# Patient Record
Sex: FEMALE | Race: WHITE | Employment: FULL TIME | URBAN - METROPOLITAN AREA
[De-identification: names, ages, dates, MRNs, and addresses within clinical notes are randomized per-mention and may not be internally consistent; named-entity substitution may affect disease eponyms.]

---

## 2021-09-17 LAB
ANTIBODY SCREEN, EXTERNAL: NEGATIVE
HBSAG, EXTERNAL: NEGATIVE
HCT, EXTERNAL: 34.5
HGB, EXTERNAL: 12.2
HIV, EXTERNAL: NEGATIVE
RPR, EXTERNAL: NORMAL
RUBELLA, EXTERNAL: NORMAL
TYPE, ABO & RH, EXTERNAL: NORMAL

## 2022-03-07 ENCOUNTER — TRANSCRIBE ORDER (OUTPATIENT)
Dept: SCHEDULING | Age: 22
End: 2022-03-07

## 2022-03-07 DIAGNOSIS — R00.0 TACHYCARDIA: Primary | ICD-10-CM

## 2022-03-14 ENCOUNTER — HOSPITAL ENCOUNTER (EMERGENCY)
Age: 22
Discharge: HOME | End: 2022-03-15
Attending: OBSTETRICS & GYNECOLOGY | Admitting: OBSTETRICS & GYNECOLOGY
Payer: COMMERCIAL

## 2022-03-14 PROCEDURE — 2709999900 HC NON-CHARGEABLE SUPPLY

## 2022-03-15 ENCOUNTER — HOSPITAL ENCOUNTER (OUTPATIENT)
Dept: NON INVASIVE DIAGNOSTICS | Age: 22
Discharge: HOME | End: 2022-03-15
Attending: INTERNAL MEDICINE
Payer: COMMERCIAL

## 2022-03-15 VITALS
SYSTOLIC BLOOD PRESSURE: 126 MMHG | HEART RATE: 98 BPM | DIASTOLIC BLOOD PRESSURE: 71 MMHG | OXYGEN SATURATION: 96 % | TEMPERATURE: 98.1 F | RESPIRATION RATE: 18 BRPM

## 2022-03-15 DIAGNOSIS — R00.0 TACHYCARDIA: ICD-10-CM

## 2022-03-15 LAB
A1 MICROGLOB PLACENTAL VAG QL: NEGATIVE
CONTROL LINE PRESENT?: NORMAL
ECHO AO ROOT DIAM: 1.8 CM
ECHO AV AREA PLAN: 3.3 CM2
ECHO LA DIAMETER: 3.7 CM
ECHO LA TO AORTIC ROOT RATIO: 2.06
ECHO LA VOL 4C: 29 ML (ref 22–52)
ECHO LV EDV A4C: 121 ML
ECHO LV EJECTION FRACTION A4C: 54 %
ECHO LV ESV A4C: 56 ML
ECHO LV FRACTIONAL SHORTENING: 35 % (ref 28–44)
ECHO LV INTERNAL DIMENSION DIASTOLIC: 5.2 CM (ref 3.9–5.3)
ECHO LV INTERNAL DIMENSION SYSTOLIC: 3.4 CM
ECHO LV IVSD: 0.9 CM (ref 0.6–0.9)
ECHO LV MASS 2D: 207.3 G (ref 67–162)
ECHO LV POSTERIOR WALL DIASTOLIC: 1.2 CM (ref 0.6–0.9)
ECHO LV RELATIVE WALL THICKNESS RATIO: 0.46
ECHO LVOT AREA: 3.8 CM2
ECHO LVOT DIAM: 2.2 CM
ECHO LVOT PEAK GRADIENT: 4 MMHG
ECHO LVOT PEAK VELOCITY: 0.9 M/S
ECHO MV A VELOCITY: 0.46 M/S
ECHO MV AREA PHT: 5.3 CM2
ECHO MV AREA PLAN: 9.1 CM2
ECHO MV E DECELERATION TIME (DT): 85.8 MS
ECHO MV E VELOCITY: 0.55 M/S
ECHO MV E/A RATIO: 1.2
ECHO MV MAX VELOCITY: 0.8 M/S
ECHO MV MEAN GRADIENT: 1 MMHG
ECHO MV MEAN VELOCITY: 0.5 M/S
ECHO MV PEAK GRADIENT: 2 MMHG
ECHO MV PRESSURE HALF TIME (PHT): 41.9 MS
ECHO MV REGURGITANT PEAK GRADIENT: 52 MMHG
ECHO MV REGURGITANT PEAK VELOCITY: 3.6 M/S
ECHO MV VTI: 15 CM
ECHO PV MAX VELOCITY: 0.8 M/S
ECHO PV PEAK GRADIENT: 3 MMHG
EXPIRATION DATE: NORMAL
INTERNAL NEGATIVE CONTROL: NORMAL
KIT LOT NO.: NORMAL

## 2022-03-15 PROCEDURE — 59025 FETAL NON-STRESS TEST: CPT

## 2022-03-15 PROCEDURE — 84112 EVAL AMNIOTIC FLUID PROTEIN: CPT | Performed by: OBSTETRICS & GYNECOLOGY

## 2022-03-15 PROCEDURE — 93306 TTE W/DOPPLER COMPLETE: CPT

## 2022-03-15 PROCEDURE — 99282 EMERGENCY DEPT VISIT SF MDM: CPT

## 2022-03-15 PROCEDURE — 93306 TTE W/DOPPLER COMPLETE: CPT | Performed by: SPECIALIST

## 2022-03-15 RX ORDER — LABETALOL 100 MG/1
100 TABLET, FILM COATED ORAL 2 TIMES DAILY
COMMUNITY

## 2022-03-15 NOTE — PROGRESS NOTES
12:07 AM  Patient presents with a complaint of a gush of fluid on 3/13 around 9210-8255. She states it was not long after intercourse when it happened. She reports that she hasn't had any further gushes and has not been earlene. 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 She has had +FM. She states she was put on Labetalol by a cardiologist and she has not seen Dr. Jo Sinclair since she was placed on it so she is unaware. She states she has been feeling better since being placed on it. She initially went to a cardiologist due to palpitations and dizziness. She said her b/p's were never awful with prenatal appointments but they would be high when she checked them at work. Patient placed on the monitor and an amnisure was obtained. 12:36 AM  Spoke to Jorge Parada CNM and informed her of the patient and requested she see her. 12:40 AM  Jerzy olvera CNM in to see the patient.

## 2022-03-15 NOTE — DISCHARGE INSTRUCTIONS
Patient Education   Patient Education   Patient Education        Weeks 34 to 39 of Your Pregnancy: Care Instructions  Overview     By now, your baby and your belly have grown quite large. It's almost time to give birth! Your baby's lungs are almost ready to breathe air. The skull bones are firm enough to protect your baby's head, but soft enough to move down through the birth canal.  You may be feeling excited and happy at times--but also anxious or scared. You might wonder how you'll know if you're in labor or what to expect during labor. Try to be open and flexible in your expectations of the birth. Because each birth is different, there's no way to know exactly what childbirth will be like for you. Talk to your doctor or midwife about any concerns you have. If you haven't already had the Tdap shot during this pregnancy, talk to your doctor about getting it. It will help protect your  against pertussis infection. In the 36th week, you'll probably have a test for group B streptococcus (GBS). GBS is a common type of bacteria that can live in the vagina and rectum. It can make your baby sick after birth. If you test positive, you will get antibiotics during labor. The medicine will help keep your baby from getting the bacteria. Follow-up care is a key part of your treatment and safety. Be sure to make and go to all appointments, and call your doctor if you are having problems. It's also a good idea to know your test results and keep a list of the medicines you take. How can you care for yourself at home? Learn about pain relief choices  · Pain is different for everyone. Talk with your doctor about your feelings about pain. · You can choose from several types of pain relief. These include medicine, breathing techniques, and comfort measures. You can use more than one option. · If you choose to have pain medicine during labor, talk to your doctor about your options.  Some medicines lower anxiety and help with some of the pain. Others make your lower body numb so that you won't feel pain. · Be sure to tell your doctor about your pain medicine choice before you start labor or very early in your labor. You may be able to change your mind as labor progresses. Labor and delivery  · The first stage of labor has three parts: early, active, and transition. ? It's common to have early labor at home. You can stay busy or rest, eat light snacks, drink clear fluids, and start counting contractions. ? When talking during a contraction gets hard, you may be moving to active labor. During active labor, you should head for the hospital if you aren't there already. ? You are in active labor when contractions come every 3 to 4 minutes and last about 60 seconds. Your cervix is opening more rapidly. ? If your water breaks, contractions will come faster and stronger. ? During transition, your cervix is stretching, and contractions are coming more rapidly. ? You may want to push, but your cervix might not be ready. Your doctor will tell you when to push. · The second stage starts when your cervix is completely opened and you are ready to push. ? Contractions are very strong to push the baby down the birth canal.  ? You will probably feel the urge to push. You may feel like you need to have a bowel movement. ? You may be coached to push with contractions. These contractions will be very strong, but you won't have them as often. You can get a little rest between contractions. ? One last push, and your baby is born. · The third stage is when a few more contractions push out the placenta. This may take 30 minutes or less. Where can you learn more? Go to http://www.gray.com/  Enter B912 in the search box to learn more about \"Weeks 34 to 36 of Your Pregnancy: Care Instructions. \"  Current as of: June 16, 2021               Content Version: 13.2  © 1508-0366 Healthwise, Incorporated.    Care instructions adapted under license by [employer] (which disclaims liability or warranty for this information). If you have questions about a medical condition or this instruction, always ask your healthcare professional. [address] any warranty or liability for your use of this information. Counting Your Baby's Kicks: Care Instructions  Overview     Counting your baby's kicks is one way your doctor can tell that your baby is healthy. Most women--especially in a first pregnancy--feel their baby move for the first time between 16 and 22 weeks. The movement may feel like flutters rather than kicks. Your baby may move more at certain times of the day. When you are active, you may notice less kicking than when you are resting. At your prenatal visits, your doctor will ask whether the baby is active. In your last trimester, your doctor may ask you to count the number of times you feel your baby move. Follow-up care is a key part of your treatment and safety. Be sure to make and go to all appointments, and call your doctor if you are having problems. It's also a good idea to know your test results and keep a list of the medicines you take. How do you count fetal kicks? · A common method of checking your baby's movement is to note the length of time it takes to count ten movements (such as kicks, flutters, or rolls). · Pick your baby's most active time of day to count. This may be any time from morning to evening. · If you don't feel 10 movements in an hour, have something to eat or drink and count for another hour. If you don't feel at least 10 movements in the 2-hour period, call your doctor. When should you call for help? Call your doctor now or seek immediate medical care if:    · You noticed that your baby has stopped moving or is moving much less than normal.   Watch closely for changes in your health, and be sure to contact your doctor if you have any problems.   Where can you learn more? Go to http://www.gray.com/  Enter [id number] in the search box to learn more about \"Counting Your Baby's Kicks: Care Instructions. \"  Current as of: 2021               Content Version: 13.2  © 7722-2610 [employer]. Care instructions adapted under license by [employer] (which disclaims liability or warranty for this information). If you have questions about a medical condition or this instruction, always ask your healthcare professional. [place] [address] any warranty or liability for your use of this information. Pregnancy Precautions: Care Instructions  Your Care Instructions     There is no sure way to prevent labor before your due date ( labor) or to prevent most other pregnancy problems. But there are things you can do to increase your chances of a healthy pregnancy. Go to your appointments, follow your doctor's advice, and take good care of yourself. Eat well, and exercise (if your doctor agrees). And make sure to drink plenty of water. Follow-up care is a key part of your treatment and safety. Be sure to make and go to all appointments, and call your doctor if you are having problems. It's also a good idea to know your test results and keep a list of the medicines you take. How can you care for yourself at home? · Make sure you go to your prenatal appointments. At each visit, your doctor will check your blood pressure. Your doctor will also check to see if you have protein in your urine. High blood pressure and protein in urine are signs of preeclampsia. This condition can be dangerous for you and your baby. · Drink plenty of fluids. Dehydration can cause contractions. If you have kidney, heart, or liver disease and have to limit fluids, talk with your doctor before you increase the amount of fluids you drink.   · Tell your doctor right away if you notice any symptoms of an infection, such as:  ? Burning when you urinate. ? A foul-smelling discharge from your vagina. ? Vaginal itching. ? Unexplained fever. ? Unusual pain or soreness in your uterus or lower belly. · Eat a balanced diet. Include plenty of foods that are high in calcium and iron. ? Foods high in calcium include milk, cheese, yogurt, almonds, and broccoli. ? Foods high in iron include red meat, shellfish, poultry, eggs, beans, raisins, whole-grain bread, and leafy green vegetables. · Do not smoke. If you need help quitting, talk to your doctor about stop-smoking programs and medicines. These can increase your chances of quitting for good. · Do not drink alcohol or use marijuana or illegal drugs. · Follow your doctor's directions about activity. Your doctor will let you know how much, if any, exercise you can do. · Ask your doctor if you can have sex. If you are at risk for early labor, your doctor may ask you to not have sex. · Take care to prevent falls. During pregnancy, your joints are loose, and your balance is off. Sports such as bicycling, skiing, or in-line skating can increase your risk of falling. And don't ride horses or motorcycles, dive, water ski, scuba dive, or parachute jump while you are pregnant. · Avoid things that can make your body too hot and may be harmful to your baby, such as a hot tub or sauna. Or talk with your doctor before doing anything that raises your body temperature. Your doctor can tell you if it's safe. · Do not take any over-the-counter or herbal medicines or supplements without talking to your doctor or pharmacist first.  When should you call for help? Call 911  anytime you think you may need emergency care.  For example, call if:    · You passed out (lost consciousness).     · You have a seizure.     · You have severe vaginal bleeding.     · You have severe pain in your belly or pelvis.     · You have had fluid gushing or leaking from your vagina and you know or think the umbilical cord is bulging into your vagina. If this happens, immediately get down on your knees so your rear end (buttocks) is higher than your head. This will decrease the pressure on the cord until help arrives. Call your doctor now or seek immediate medical care if:    · You have signs of preeclampsia, such as:  ? Sudden swelling of your face, hands, or feet. ? New vision problems (such as dimness, blurring, or seeing spots). ? A severe headache.     · You have any vaginal bleeding.     · You have belly pain or cramping.     · You have a fever.     · You have had regular contractions (with or without pain) for an hour. This means that you have 8 or more within 1 hour or 4 or more in 20 minutes after you change your position and drink fluids.     · You have a sudden release of fluid from your vagina.     · You have low back pain or pelvic pressure that does not go away.     · You notice that your baby has stopped moving or is moving much less than normal.   Watch closely for changes in your health, and be sure to contact your doctor if you have any problems. Where can you learn more? Go to http://www.hernandez.com/  Enter Y951 in the search box to learn more about \"Pregnancy Precautions: Care Instructions. \"  Current as of: June 16, 2021               Content Version: 13.2  © 2006-2022 Healthwise, [employer] Care instructions adapted under license by [employer] (which disclaims liability or warranty for this information). If you have questions about a medical condition or this instruction, always ask your healthcare professional. [place] [address] any warranty or liability for your use of this information.

## 2022-03-15 NOTE — H&P
[facility] History & Physical    Subjective: Rob Hoffman is a 19 y.o. female  SIUP @ 35w5d by Estimated Date of Delivery: 22 who arrives to L&D with chief complaint of leaking of fluid that started on 3/13 around 2200. She had to go to work today, so she delayed coming in until she got off. She reports having intercourse prior to the sensation of leaking. She denies any continued leaking, needing to change underwear or wear a pad. She   endorses +FM; Pt denies vaginal bleeding, fever/chills, chest pain, SOB, HA, scotomata, sudden swelling, nor malaise. She reports that this pregnancy has been complicated by maternal tachycardia for which she recently saw cardiology for and was placed on labetalol BID this week. Pregnancy problems include:     -   Patient Active Problem List   Diagnosis Code    Tachycardia R00.0       Allergies  - No Known Allergies    Prenatal Labs        OB History  OB History        1    Para   0    Term   0       0    AB   0    Living   0       SAB   0    IAB   0    Ectopic   0    Molar   0    Multiple   0    Live Births   0                 PMHx  Past Medical History:   Diagnosis Date    Essential hypertension     Thyroid activity decreased     pt was previouslt medicated before pregnancy with spironlactone        PSHx  History reviewed. No pertinent surgical history. F/SHx  History reviewed. No pertinent family history.    Social History     Socioeconomic History    Marital status: SINGLE     Spouse name: Not on file    Number of children: Not on file    Years of education: Not on file    Highest education level: Not on file   Occupational History    Not on file   Tobacco Use    Smoking status: Never Smoker    Smokeless tobacco: Never Used   Vaping Use    Vaping Use: Never used   Substance and Sexual Activity    Alcohol use: Not Currently    Drug use: Never    Sexual activity: Not on file   Other Topics Concern   [address] Service Not Asked    Blood Transfusions Not Asked    Caffeine Concern Not Asked    Occupational Exposure Not Asked    Hobby Hazards Not Asked    Sleep Concern Not Asked    Stress Concern Not Asked    Weight Concern Not Asked    Special Diet Not Asked    Back Care Not Asked    Exercise Not Asked    Bike Helmet Not Asked   [address] Not Asked    Self-Exams Not Asked   Social History Narrative    Not on file     Social Determinants of Health     Financial Resource Strain:     Difficulty of Paying Living Expenses: Not on file   Food Insecurity:     Worried About Running Out of Food in the Last Year: Not on file    Aaliyah of Food in the Last Year: Not on file   Transportation Needs:     Lack of Transportation (Medical): Not on file    Lack of Transportation (Non-Medical): Not on file   Physical Activity:     Days of Exercise per Week: Not on file    Minutes of Exercise per Session: Not on file   Stress:     Feeling of Stress : Not on file   Social Connections:     Frequency of Communication with Friends and Family: Not on file    Frequency of Social Gatherings with Friends and Family: Not on file    Attends Yazidi Services: Not on file    Active Member of [address] [place] [address] or Organizations: Not on file    Attends Club or Organization Meetings: Not on file    Marital Status: Not on file   Intimate Partner Violence:     Fear of Current or Ex-Partner: Not on file    Emotionally Abused: Not on file    Physically Abused: Not on file    Sexually Abused: Not on file   Housing Stability:     Unable to Pay for Housing in the Last Year: Not on file    Number of [place] in the Last Year: Not on file    Unstable Housing in the Last Year: Not on file       Home Medications:  Prior to Admission Medications   Prescriptions Last Dose Informant Patient Reported? Taking?   labetaloL (NORMODYNE) 100 mg tablet 3/15/2022 at Unknown time  Yes Yes   Sig: Take 100 mg by mouth two (2) times a day.       Facility-Administered Medications: None        Review of Systems:  A comprehensive review of systems was negative except for that written in the History of Present Illness. Objective:     Vitals:    03/15/22 0017 03/15/22 0020   BP: 126/71    Pulse: 98    Resp: 18    Temp: 98.1 °F (36.7 °C)    SpO2: 96% 96%      There is no height or weight on file to calculate BMI. Physical Exam:  General:  Alert, cooperative, no distress, appears stated age. Lungs:   Symmetrical expansion, non-labored   Heart:  Regular rate   GI/Abdomen:   Soft, non-tender. Gravid. Extremities: Extremities normal, atraumatic, no cyanosis or edema. Skin: Skin color, texture, turgor normal. No rashes or lesions   /Cervical Exam: no lesions or erythema  Cervix:  deferred- patient not feeling contraction   Fetal Presentation: Vertex by Leopolds  Pelvimetry: Adequate  Fetal Size: AGA      Electronic Fetal Monitoring    Fetal Heart Rate:   140, moderate variability, +accels, no decels, cat 1 FHT    Uterine Contractions: External, occasional, relaxed to palpation    Amnisure- negative     Assessment:   19 y.o. female  SIUP @  35w5 d by DILSHAD of Estimated Date of Delivery: 22   diagnoses:   -IUP at 35w5d   -no signs of rupture of membranes or labor         Pregnancy problems include:   -   Patient Active Problem List   Diagnosis Code    Tachycardia R00.0         EFM:   - Category 1  - NST: Fetal NST Findings: reactive with indication of establishing a baseline fetal evaluation conducted over a minimum of 20 minutes    Plan:   Admit to discharge home in stable condition     Patient has an appointment on Wednesday  Discussed PTL precautions and FKC. Advised patient to discuss new medication with provider at her visit      Patient has been counseled regarding this plan of care and is in agreement; all questions answered.     Collaborative MD: Dr. Dylon Barboza By:  Arnold Mancia CNM      3/15/2022 12:46 AM

## 2022-03-16 LAB — GRBS, EXTERNAL: NEGATIVE

## 2022-03-24 PROBLEM — R00.0 TACHYCARDIA: Status: ACTIVE | Noted: 2022-03-15

## 2022-04-01 ENCOUNTER — HOSPITAL ENCOUNTER (INPATIENT)
Age: 22
LOS: 6 days | Discharge: HOME | End: 2022-04-07
Attending: OBSTETRICS & GYNECOLOGY | Admitting: OBSTETRICS & GYNECOLOGY
Payer: COMMERCIAL

## 2022-04-01 DIAGNOSIS — M54.81 BILATERAL OCCIPITAL NEURALGIA: ICD-10-CM

## 2022-04-01 DIAGNOSIS — R51.9 ACUTE NONINTRACTABLE HEADACHE, UNSPECIFIED HEADACHE TYPE: Primary | ICD-10-CM

## 2022-04-01 DIAGNOSIS — Z3A.38 38 WEEKS GESTATION OF PREGNANCY: ICD-10-CM

## 2022-04-01 DIAGNOSIS — G44.86 CERVICOGENIC HEADACHE: ICD-10-CM

## 2022-04-01 PROBLEM — Z34.90 PREGNANCY: Status: ACTIVE | Noted: 2022-04-01

## 2022-04-01 LAB
ALBUMIN SERPL-MCNC: 3 G/DL (ref 3.5–5)
ALBUMIN/GLOB SERPL: 0.9 {RATIO} (ref 1.1–2.2)
ALP SERPL-CCNC: 137 U/L (ref 45–117)
ALT SERPL-CCNC: 19 U/L (ref 12–78)
ANION GAP SERPL CALC-SCNC: 10 MMOL/L (ref 5–15)
AST SERPL-CCNC: 21 U/L (ref 15–37)
BASOPHILS # BLD: 0 K/UL (ref 0–0.1)
BASOPHILS NFR BLD: 0 % (ref 0–1)
BILIRUB SERPL-MCNC: 0.3 MG/DL (ref 0.2–1)
BUN SERPL-MCNC: 7 MG/DL (ref 6–20)
BUN/CREAT SERPL: 10 (ref 12–20)
CALCIUM SERPL-MCNC: 9.2 MG/DL (ref 8.5–10.1)
CHLORIDE SERPL-SCNC: 105 MMOL/L (ref 97–108)
CO2 SERPL-SCNC: 21 MMOL/L (ref 21–32)
CREAT SERPL-MCNC: 0.68 MG/DL (ref 0.55–1.02)
CREAT UR-MCNC: 60 MG/DL
DIFFERENTIAL METHOD BLD: ABNORMAL
EOSINOPHIL # BLD: 0.1 K/UL (ref 0–0.4)
EOSINOPHIL NFR BLD: 1 % (ref 0–7)
ERYTHROCYTE [DISTWIDTH] IN BLOOD BY AUTOMATED COUNT: 13.8 % (ref 11.5–14.5)
GLOBULIN SER CALC-MCNC: 3.4 G/DL (ref 2–4)
GLUCOSE SERPL-MCNC: 98 MG/DL (ref 65–100)
HCT VFR BLD AUTO: 31.3 % (ref 35–47)
HGB BLD-MCNC: 10.2 G/DL (ref 11.5–16)
IMM GRANULOCYTES # BLD AUTO: 0 K/UL (ref 0–0.04)
IMM GRANULOCYTES NFR BLD AUTO: 1 % (ref 0–0.5)
LYMPHOCYTES # BLD: 1.9 K/UL (ref 0.8–3.5)
LYMPHOCYTES NFR BLD: 22 % (ref 12–49)
MCH RBC QN AUTO: 28.2 PG (ref 26–34)
MCHC RBC AUTO-ENTMCNC: 32.6 G/DL (ref 30–36.5)
MCV RBC AUTO: 86.5 FL (ref 80–99)
MONOCYTES # BLD: 0.9 K/UL (ref 0–1)
MONOCYTES NFR BLD: 10 % (ref 5–13)
NEUTS SEG # BLD: 5.8 K/UL (ref 1.8–8)
NEUTS SEG NFR BLD: 66 % (ref 32–75)
NRBC # BLD: 0 K/UL (ref 0–0.01)
NRBC BLD-RTO: 0 PER 100 WBC
PLATELET # BLD AUTO: 155 K/UL (ref 150–400)
PMV BLD AUTO: 11.8 FL (ref 8.9–12.9)
POTASSIUM SERPL-SCNC: 3.8 MMOL/L (ref 3.5–5.1)
PROT SERPL-MCNC: 6.4 G/DL (ref 6.4–8.2)
PROT UR-MCNC: 13 MG/DL (ref 0–11.9)
PROT/CREAT UR-RTO: 0.2
RBC # BLD AUTO: 3.62 M/UL (ref 3.8–5.2)
SODIUM SERPL-SCNC: 136 MMOL/L (ref 136–145)
WBC # BLD AUTO: 8.7 K/UL (ref 3.6–11)

## 2022-04-01 PROCEDURE — 84156 ASSAY OF PROTEIN URINE: CPT

## 2022-04-01 PROCEDURE — 36415 COLL VENOUS BLD VENIPUNCTURE: CPT

## 2022-04-01 PROCEDURE — 80053 COMPREHEN METABOLIC PANEL: CPT

## 2022-04-01 PROCEDURE — 74011250637 HC RX REV CODE- 250/637: Performed by: OBSTETRICS & GYNECOLOGY

## 2022-04-01 PROCEDURE — 65270000029 HC RM PRIVATE

## 2022-04-01 PROCEDURE — 85025 COMPLETE CBC W/AUTO DIFF WBC: CPT

## 2022-04-01 RX ORDER — OXYCODONE HYDROCHLORIDE 10 MG/1
5 TABLET ORAL
Qty: 5 TABLET | Refills: 0 | Status: SHIPPED | OUTPATIENT
Start: 2022-04-01 | End: 2022-04-04

## 2022-04-01 RX ORDER — BUTALBITAL, ACETAMINOPHEN AND CAFFEINE 50; 325; 40 MG/1; MG/1; MG/1
1 TABLET ORAL
Status: DISCONTINUED | OUTPATIENT
Start: 2022-04-01 | End: 2022-04-02

## 2022-04-01 RX ORDER — OXYCODONE HYDROCHLORIDE 5 MG/1
10 TABLET ORAL
Status: DISCONTINUED | OUTPATIENT
Start: 2022-04-01 | End: 2022-04-07 | Stop reason: HOSPADM

## 2022-04-01 RX ADMIN — OXYCODONE 10 MG: 5 TABLET ORAL at 18:47

## 2022-04-01 RX ADMIN — BUTALBITAL, ACETAMINOPHEN, AND CAFFEINE 1 TABLET: 50; 325; 40 TABLET ORAL at 17:11

## 2022-04-01 NOTE — H&P
Ob Triage Note    Pt is a 19 y. o.female.  at 38w1d presents from the office for PreE evaluation. She reports HA that started yesterday and is not relieved for more than 2 hrs with Tylenol. She has occasional vision changes. Currently on labetalol 100 mg po bid for tachycardia. The patient denies LOF, vaginal bleeding, N/V/F/C. Pt reports good fetal movement. Pregnancy uncomplicated. Past Medical History:   Diagnosis Date    Essential hypertension     Thyroid activity decreased     pt was previouslt medicated before pregnancy with spironlactone     Visit Vitals  /80   Pulse 95   SpO2 98%     No data found. EXAM:  Cervical Exam:  deferred  Membranes:  Intact  Uterine Activity: None  Fetal Heart Rate: Reactive cat 1    Labs:  Recent Results (from the past 12 hour(s))   METABOLIC PANEL, COMPREHENSIVE    Collection Time: 22  3:13 PM   Result Value Ref Range    Sodium 136 136 - 145 mmol/L    Potassium 3.8 3.5 - 5.1 mmol/L    Chloride 105 97 - 108 mmol/L    CO2 21 21 - 32 mmol/L    Anion gap 10 5 - 15 mmol/L    Glucose 98 65 - 100 mg/dL    BUN 7 6 - 20 MG/DL    Creatinine 0.68 0.55 - 1.02 MG/DL    BUN/Creatinine ratio 10 (L) 12 - 20      GFR est AA >60 >60 ml/min/1.73m2    GFR est non-AA >60 >60 ml/min/1.73m2    Calcium 9.2 8.5 - 10.1 MG/DL    Bilirubin, total 0.3 0.2 - 1.0 MG/DL    ALT (SGPT) 19 12 - 78 U/L    AST (SGOT) 21 15 - 37 U/L    Alk.  phosphatase 137 (H) 45 - 117 U/L    Protein, total 6.4 6.4 - 8.2 g/dL    Albumin 3.0 (L) 3.5 - 5.0 g/dL    Globulin 3.4 2.0 - 4.0 g/dL    A-G Ratio 0.9 (L) 1.1 - 2.2     CBC WITH AUTOMATED DIFF    Collection Time: 22  3:13 PM   Result Value Ref Range    WBC 8.7 3.6 - 11.0 K/uL    RBC 3.62 (L) 3.80 - 5.20 M/uL    HGB 10.2 (L) 11.5 - 16.0 g/dL    HCT 31.3 (L) 35.0 - 47.0 %    MCV 86.5 80.0 - 99.0 FL    MCH 28.2 26.0 - 34.0 PG    MCHC 32.6 30.0 - 36.5 g/dL    RDW 13.8 11.5 - 14.5 %    PLATELET 292 701 - 983 K/uL    MPV 11.8 8.9 - 12.9 FL    NRBC 0.0 0  WBC    ABSOLUTE NRBC 0.00 0.00 - 0.01 K/uL    NEUTROPHILS 66 32 - 75 %    LYMPHOCYTES 22 12 - 49 %    MONOCYTES 10 5 - 13 %    EOSINOPHILS 1 0 - 7 %    BASOPHILS 0 0 - 1 %    IMMATURE GRANULOCYTES 1 (H) 0.0 - 0.5 %    ABS. NEUTROPHILS 5.8 1.8 - 8.0 K/UL    ABS. LYMPHOCYTES 1.9 0.8 - 3.5 K/UL    ABS. MONOCYTES 0.9 0.0 - 1.0 K/UL    ABS. EOSINOPHILS 0.1 0.0 - 0.4 K/UL    ABS. BASOPHILS 0.0 0.0 - 0.1 K/UL    ABS. IMM. GRANS. 0.0 0.00 - 0.04 K/UL    DF AUTOMATED     PROTEIN/CREATININE RATIO, URINE    Collection Time: 04/01/22  3:13 PM   Result Value Ref Range    Protein, urine random 13 (H) 0.0 - 11.9 mg/dL    Creatinine, urine 60.00 mg/dL    Protein/Creat. urine Ratio 0.2         No results found for this or any previous visit. ASSESSMENT:  IUP at 38w1d r/o preE    PLAN:  Labs wnl, PCR 0.2  Fiorcet did not help HA, will try oxycodone. Plan 24 hr urine. Follow up after oxycodone to see if HA has improved.       Margy Abad DO, [address] [facility],[address] [facility]

## 2022-04-01 NOTE — PROGRESS NOTES
1444 PT ambulates onto unit for Pre-E evaluation. PT reporting headache and floaters. 1500 Labs drawn and sent. 1910 SBAR report given to MARY Manzo RN. Care handed off at this time.

## 2022-04-02 LAB
ALBUMIN SERPL-MCNC: 2.9 G/DL (ref 3.5–5)
ALBUMIN/GLOB SERPL: 0.9 {RATIO} (ref 1.1–2.2)
ALP SERPL-CCNC: 127 U/L (ref 45–117)
ALT SERPL-CCNC: 18 U/L (ref 12–78)
ANION GAP SERPL CALC-SCNC: 6 MMOL/L (ref 5–15)
AST SERPL-CCNC: 19 U/L (ref 15–37)
BILIRUB SERPL-MCNC: 0.2 MG/DL (ref 0.2–1)
BUN SERPL-MCNC: 6 MG/DL (ref 6–20)
BUN/CREAT SERPL: 8 (ref 12–20)
CALCIUM SERPL-MCNC: 9.2 MG/DL (ref 8.5–10.1)
CHLORIDE SERPL-SCNC: 107 MMOL/L (ref 97–108)
CO2 SERPL-SCNC: 23 MMOL/L (ref 21–32)
CREAT SERPL-MCNC: 0.74 MG/DL (ref 0.55–1.02)
CREAT UR-MCNC: 100 MG/DL
ERYTHROCYTE [DISTWIDTH] IN BLOOD BY AUTOMATED COUNT: 13.9 % (ref 11.5–14.5)
GLOBULIN SER CALC-MCNC: 3.2 G/DL (ref 2–4)
GLUCOSE SERPL-MCNC: 91 MG/DL (ref 65–100)
HCT VFR BLD AUTO: 31.3 % (ref 35–47)
HGB BLD-MCNC: 10.1 G/DL (ref 11.5–16)
MCH RBC QN AUTO: 27.6 PG (ref 26–34)
MCHC RBC AUTO-ENTMCNC: 32.3 G/DL (ref 30–36.5)
MCV RBC AUTO: 85.5 FL (ref 80–99)
NRBC # BLD: 0 K/UL (ref 0–0.01)
NRBC BLD-RTO: 0 PER 100 WBC
PLATELET # BLD AUTO: 151 K/UL (ref 150–400)
PMV BLD AUTO: 11.2 FL (ref 8.9–12.9)
POTASSIUM SERPL-SCNC: 4.2 MMOL/L (ref 3.5–5.1)
PROT SERPL-MCNC: 6.1 G/DL (ref 6.4–8.2)
PROT UR-MCNC: 17 MG/DL (ref 0–11.9)
PROT/CREAT UR-RTO: 0.2
RBC # BLD AUTO: 3.66 M/UL (ref 3.8–5.2)
SODIUM SERPL-SCNC: 136 MMOL/L (ref 136–145)
WBC # BLD AUTO: 8.3 K/UL (ref 3.6–11)

## 2022-04-02 PROCEDURE — 85027 COMPLETE CBC AUTOMATED: CPT

## 2022-04-02 PROCEDURE — 80053 COMPREHEN METABOLIC PANEL: CPT

## 2022-04-02 PROCEDURE — G0378 HOSPITAL OBSERVATION PER HR: HCPCS

## 2022-04-02 PROCEDURE — 84156 ASSAY OF PROTEIN URINE: CPT

## 2022-04-02 PROCEDURE — 74011250636 HC RX REV CODE- 250/636: Performed by: OBSTETRICS & GYNECOLOGY

## 2022-04-02 PROCEDURE — 74011250637 HC RX REV CODE- 250/637: Performed by: OBSTETRICS & GYNECOLOGY

## 2022-04-02 PROCEDURE — 65270000029 HC RM PRIVATE

## 2022-04-02 PROCEDURE — 36415 COLL VENOUS BLD VENIPUNCTURE: CPT

## 2022-04-02 RX ORDER — BUTALBITAL, ACETAMINOPHEN AND CAFFEINE 50; 325; 40 MG/1; MG/1; MG/1
2 TABLET ORAL ONCE
Status: COMPLETED | OUTPATIENT
Start: 2022-04-02 | End: 2022-04-02

## 2022-04-02 RX ORDER — ACETAMINOPHEN 325 MG/1
975 TABLET ORAL
Status: DISCONTINUED | OUTPATIENT
Start: 2022-04-02 | End: 2022-04-03

## 2022-04-02 RX ORDER — CYPROHEPTADINE HYDROCHLORIDE 4 MG/1
4 TABLET ORAL
Status: DISCONTINUED | OUTPATIENT
Start: 2022-04-02 | End: 2022-04-03

## 2022-04-02 RX ORDER — PROCHLORPERAZINE EDISYLATE 5 MG/ML
10 INJECTION INTRAMUSCULAR; INTRAVENOUS
Status: COMPLETED | OUTPATIENT
Start: 2022-04-02 | End: 2022-04-02

## 2022-04-02 RX ADMIN — BUTALBITAL, ACETAMINOPHEN, AND CAFFEINE 2 TABLET: 50; 325; 40 TABLET ORAL at 10:39

## 2022-04-02 RX ADMIN — CYPROHEPTADINE HYDROCHLORIDE 4 MG: 4 TABLET ORAL at 16:15

## 2022-04-02 RX ADMIN — PROCHLORPERAZINE EDISYLATE 10 MG: 5 INJECTION INTRAMUSCULAR; INTRAVENOUS at 21:36

## 2022-04-02 NOTE — PROGRESS NOTES
0755 Pt sleeping.    0955 Shift assessment done. Plan of care discussed. VSS Pt has ongoing headache not resolved with pain medication. Pt denies blurred vision, epigastric pain or n/v.     1030 Dr Felecia Valentin updated on pt's headache 4/10 and vs.  Orders received for Fioricet 2 tab and hold labetalol for now. 1410 Pt resting without complaints. 1455 Pt c/o frontal headache 6/10. Pt would like some Tylenol. VSS    1500 Dr Felecia Valentin updated on pt c/o headache 6/10. Orders received for Tylenol 975mg po.    1850 Pt sitting up in bed eating dinner. Pt verbalized headache is \"better\" but still there. Pt verbalized no needs at this time.     1910 Shift report given to Wang Mijares RN

## 2022-04-02 NOTE — PROGRESS NOTES
Observation Progress Note    La Mariano  38w2d    Patient admitted for HA, vision changes, r/o atypical preE 38w2d Estimated Date of Delivery: 22 states she does have mild headache . Intermittent vision floaters. Also disclosed today that she has had RUQ pain for a month. Takes labetalol 100 mg po bid for tachycardia. Vitals:  Patient Vitals for the past 24 hrs:   BP Temp Pulse Resp SpO2   22 0852 123/69 98.1 °F (36.7 °C)  18    22 0127     97 %   22 0125     91 %   22 0122     98 %   22 0119 129/85  80     22 0117     97 %   22 0112     97 %   22 0107     98 %   22 0102     97 %   22 0100 121/71  77     22 2336 122/74       22 2136 125/87       22 1939  98.4 °F (36.9 °C)  16    22 1936 125/71       22 1608     98 %   22 1603     98 %   22 1558     99 %   22 1553     98 %   22 1549 128/80  95     22 1548     99 %   22 1543     97 %   22 1539 124/75  (!) 102     22 1538     97 %   22 1533     97 %   22 1529 129/76  100     22 1528     97 %   22 1523     97 %   22 1518     97 %     Temp (24hrs), Av.3 °F (36.8 °C), Min:98.1 °F (36.7 °C), Max:98.4 °F (36.9 °C)    I&O:   No intake/output data recorded. No intake/output data recorded.   NST:  Reactive  Uterine Activity: None    Exam:  Patient: comfortable without distress               Abdomen soft, gravid, NT               Fundus NT               Lower extremities NT without edema                                         Labs:   Recent Results (from the past 24 hour(s))   METABOLIC PANEL, COMPREHENSIVE    Collection Time: 22  3:13 PM   Result Value Ref Range    Sodium 136 136 - 145 mmol/L    Potassium 3.8 3.5 - 5.1 mmol/L    Chloride 105 97 - 108 mmol/L    CO2 21 21 - 32 mmol/L    Anion gap 10 5 - 15 mmol/L    Glucose 98 65 - 100 mg/dL    BUN 7 6 - 20 MG/DL    Creatinine 0.68 0.55 - 1.02 MG/DL    BUN/Creatinine ratio 10 (L) 12 - 20      GFR est AA >60 >60 ml/min/1.73m2    GFR est non-AA >60 >60 ml/min/1.73m2    Calcium 9.2 8.5 - 10.1 MG/DL    Bilirubin, total 0.3 0.2 - 1.0 MG/DL    ALT (SGPT) 19 12 - 78 U/L    AST (SGOT) 21 15 - 37 U/L    Alk. phosphatase 137 (H) 45 - 117 U/L    Protein, total 6.4 6.4 - 8.2 g/dL    Albumin 3.0 (L) 3.5 - 5.0 g/dL    Globulin 3.4 2.0 - 4.0 g/dL    A-G Ratio 0.9 (L) 1.1 - 2.2     CBC WITH AUTOMATED DIFF    Collection Time: 04/01/22  3:13 PM   Result Value Ref Range    WBC 8.7 3.6 - 11.0 K/uL    RBC 3.62 (L) 3.80 - 5.20 M/uL    HGB 10.2 (L) 11.5 - 16.0 g/dL    HCT 31.3 (L) 35.0 - 47.0 %    MCV 86.5 80.0 - 99.0 FL    MCH 28.2 26.0 - 34.0 PG    MCHC 32.6 30.0 - 36.5 g/dL    RDW 13.8 11.5 - 14.5 %    PLATELET 989 364 - 540 K/uL    MPV 11.8 8.9 - 12.9 FL    NRBC 0.0 0  WBC    ABSOLUTE NRBC 0.00 0.00 - 0.01 K/uL    NEUTROPHILS 66 32 - 75 %    LYMPHOCYTES 22 12 - 49 %    MONOCYTES 10 5 - 13 %    EOSINOPHILS 1 0 - 7 %    BASOPHILS 0 0 - 1 %    IMMATURE GRANULOCYTES 1 (H) 0.0 - 0.5 %    ABS. NEUTROPHILS 5.8 1.8 - 8.0 K/UL    ABS. LYMPHOCYTES 1.9 0.8 - 3.5 K/UL    ABS. MONOCYTES 0.9 0.0 - 1.0 K/UL    ABS. EOSINOPHILS 0.1 0.0 - 0.4 K/UL    ABS. BASOPHILS 0.0 0.0 - 0.1 K/UL    ABS. IMM. GRANS. 0.0 0.00 - 0.04 K/UL    DF AUTOMATED     PROTEIN/CREATININE RATIO, URINE    Collection Time: 04/01/22  3:13 PM   Result Value Ref Range    Protein, urine random 13 (H) 0.0 - 11.9 mg/dL    Creatinine, urine 60.00 mg/dL    Protein/Creat. urine Ratio 0.2         Assessment and Plan:   IUP @ 38w2d with HA  1.  OB stable  2. Collecting 24 hr urine  3. Fiorcet for HA  4. Repeat labs and PCR now.     Katy Seals, , [address] [facility],[address] [facility]

## 2022-04-02 NOTE — PROGRESS NOTES
2333 Pt may be off the monitor for sleep per Dr Solorzano Boards Pt sitting up in bed eating.    0300 Pt sleeping, respirations unlabored. 0500 Pt sleeping.    0615 Pt sleeping.

## 2022-04-02 NOTE — PROGRESS NOTES
Pt HA returned. Cancel discharge and will keep patient in house for 24 hr urine and BP monitoring. Cat 1 tracing, may d/c while sleeping. Regular diet.     Yessi Goins DO, [address] [facility],[address] [facility]

## 2022-04-02 NOTE — PROGRESS NOTES
"Problem: Goal Outcome Summary  Goal: Goal Outcome Summary  Outcome: No Change  Patient VSS. SBA up to bathroom. Straining urine, no sediment/stones noted. Urine tristian colored, foul smelling. Was noted in plan from MD yesterday that \"not an infected stone.\" Will continue to monitor. Patient's pain controlled with morphine during evening, Toradol in the morning. Has not had nausea during night. NPO since midnight, mouth swabs provided. RN and patient communicating through writing. Patient uses call light appropriately. Planning for an in person  this morning.       " Labor Progress Note  Patient seen, fetal heart rate and contraction pattern evaluated. Patient Vitals for the past 2 hrs:   Temp Resp   04/01/22 1939 98.4 °F (36.9 °C) 16       Physical Exam:  Uterine Activity: None  Fetal Heart Rate: Reactive    Assessment/Plan:  IUP at 38w1d r/o preE    Reassuring fetal status  GBS neg  HA improved with oxycodone. Plan to discharge home with strict precautions. Pt will complete 24 hr urine at home and bring to office Monday am and have BP check. Will send Rx to pharm for oxycodone should HA return. ? Migraine.      Nahum Black DO, [address] [facility],[address] [facility]

## 2022-04-03 LAB
COLLECT DURATION TIME UR: 24 HR
COVID-19 RAPID TEST, COVR: NOT DETECTED
CREAT UR-MCNC: 136 MG/DL
PROT 24H UR-MRATE: 240 MG/24HR
PROT UR-MCNC: 24 MG/DL (ref 0–11.9)
PROT/CREAT UR-RTO: 0.2
SOURCE, COVRS: NORMAL
SPECIMEN VOL ?TM UR: 2000 ML

## 2022-04-03 PROCEDURE — G0378 HOSPITAL OBSERVATION PER HR: HCPCS

## 2022-04-03 PROCEDURE — 74011000258 HC RX REV CODE- 258: Performed by: OBSTETRICS & GYNECOLOGY

## 2022-04-03 PROCEDURE — 74011250637 HC RX REV CODE- 250/637: Performed by: OBSTETRICS & GYNECOLOGY

## 2022-04-03 PROCEDURE — 84156 ASSAY OF PROTEIN URINE: CPT

## 2022-04-03 PROCEDURE — 74011250636 HC RX REV CODE- 250/636: Performed by: OBSTETRICS & GYNECOLOGY

## 2022-04-03 PROCEDURE — 87635 SARS-COV-2 COVID-19 AMP PRB: CPT

## 2022-04-03 PROCEDURE — 99221 1ST HOSP IP/OBS SF/LOW 40: CPT | Performed by: PSYCHIATRY & NEUROLOGY

## 2022-04-03 PROCEDURE — 65270000029 HC RM PRIVATE

## 2022-04-03 RX ORDER — CYCLOBENZAPRINE HCL 10 MG
10 TABLET ORAL
Status: DISCONTINUED | OUTPATIENT
Start: 2022-04-03 | End: 2022-04-05

## 2022-04-03 RX ORDER — BUTALBITAL, ACETAMINOPHEN AND CAFFEINE 50; 325; 40 MG/1; MG/1; MG/1
2 TABLET ORAL
Status: DISCONTINUED | OUTPATIENT
Start: 2022-04-03 | End: 2022-04-05

## 2022-04-03 RX ORDER — SODIUM CHLORIDE, SODIUM LACTATE, POTASSIUM CHLORIDE, CALCIUM CHLORIDE 600; 310; 30; 20 MG/100ML; MG/100ML; MG/100ML; MG/100ML
125 INJECTION, SOLUTION INTRAVENOUS CONTINUOUS
Status: DISCONTINUED | OUTPATIENT
Start: 2022-04-04 | End: 2022-04-07 | Stop reason: HOSPADM

## 2022-04-03 RX ADMIN — PROMETHAZINE HYDROCHLORIDE 25 MG: 25 INJECTION INTRAMUSCULAR; INTRAVENOUS at 15:22

## 2022-04-03 RX ADMIN — BUTALBITAL, ACETAMINOPHEN, AND CAFFEINE 2 TABLET: 50; 325; 40 TABLET ORAL at 15:49

## 2022-04-03 RX ADMIN — SODIUM CHLORIDE, POTASSIUM CHLORIDE, SODIUM LACTATE AND CALCIUM CHLORIDE 125 ML/HR: 600; 310; 30; 20 INJECTION, SOLUTION INTRAVENOUS at 23:37

## 2022-04-03 RX ADMIN — CYCLOBENZAPRINE 10 MG: 10 TABLET, FILM COATED ORAL at 13:08

## 2022-04-03 RX ADMIN — MEPERIDINE HYDROCHLORIDE 50 MG: 25 INJECTION INTRAMUSCULAR; INTRAVENOUS; SUBCUTANEOUS at 03:49

## 2022-04-03 NOTE — CONSULTS
NEUROLOGY CONSULT NOTE    Patient Mallorie Sutherland  [id number]  58 y.o.  2000    Date of Consultation:  April 3, 2022    Referring Physician: Dr. Thayer Galeazzi     Reason for Consultation:  headaches    History of Present Illness:     Patient Active Problem List    Diagnosis Date Noted    Pregnancy 04/01/2022    Tachycardia 03/15/2022     Past Medical History:   Diagnosis Date    Essential hypertension     Thyroid activity decreased     pt was previouslt medicated before pregnancy with spironlactone      No past surgical history on file. Prior to Admission medications    Medication Sig Start Date End Date Taking? Authorizing Provider   oxyCODONE IR (ROXICODONE) 10 mg tab immediate release tablet Take 0.5 Tablets by mouth every six (6) hours as needed (headache) for up to 3 days. Max Daily Amount: 20 mg. 4/1/22 4/4/22 Yes Sandor Blanchard,    labetaloL (NORMODYNE) 100 mg tablet Take 100 mg by mouth two (2) times a day. Provider, Historical     No Known Allergies   Social History     Tobacco Use    Smoking status: Never Smoker    Smokeless tobacco: Never Used   Substance Use Topics    Alcohol use: Not Currently      No family history on file. Subjective: Carmelita Pratt is a 19 y.o. who is 45 weeks pregnant who was admitted for headache and blurred vision. Patient complaining of headache for the past 4 days associated with vision changes. Note mentions no relief when Fioricet and trial of oxycodone. Yesterday patient continues to complain of mild headache with intermittent visual floaters. Also complaining of right upper quadrant pain for months. Patient is on labetalol 100 mg twice daily for tachycardia. Labs done 4/1/2022 revealed CMP showing increased alkaline phosphatase at 137 and decreased albumin at 3. CBC showing decreased hemoglobin at 10.2 and decreased RBC at 3.62. Increased protein in the urine random and 24 hours. Negative HIV testing, RPR, hepatitis B.   Repeat labs done 4/2/2022 revealed decreased RBC at 3.66 and decreased hemoglobin at 10.1. CMP showed increased alkaline phosphatase at 127 and decreased albumin at 2.9. Negative Covid testing. Review of medications given revealed patient received 2 tablets of Fioricet yesterday and Compazine 10 mg IV injection. Received Demerol 50 mg this morning. When seen, patient reports still having some mild headache. Patient reports prior headaches before pregnancy that were more sinus related. His headaches are different. Headaches are located right frontotemporal that radiates back to the head or same site on the left. More pressure related. At its worst a 7/10. Currently a 4-5 over 10. Associated with seeing specks or floaters on either eye. Baseline issues with light sensitivity. Certainly not the worst headache of patient's life. Responding to medication but recurs. Outside reports reviewed: lab reports, historical medical records. Review of Systems:    Pertinent items are noted in HPI. Objective:     Patient Vitals for the past 8 hrs:   BP Temp Pulse Resp SpO2   04/03/22 0924     97 %   04/03/22 0923 119/77 98.6 °F (37 °C) 86 18    04/03/22 0340 119/71  86       PHYSICAL EXAM:    NEUROLOGICAL EXAM:    Appearance: The patient is obese, pregnant, provides a coherent history and is in no acute distress. Mental Status: Oriented to time, place and person. Fluent, no aphasia or dysarthria. Mood and affect appropriate. Cranial Nerves:   Intact visual fields. VA 20/20 OU on near vision without correction. Fundi are benign. No papilledema. Disc margins are sharp ANIKA, EOM's full, no nystagmus, no ptosis. Facial sensation is normal. Corneal reflexes are intact. Facial movement is symmetric. Hearing is normal bilaterally. Palate is midline with normal elevation. Sternocleidomastoid and trapezius muscles are normal. Tongue is midline.    Motor:  5/5 strength in upper and lower proximal and distal muscles. Normal bulk and tone. No fasciculations. No pronator drift. Reflexes:   Deep tendon reflexes 2+/4 and symmetrical. Downgoing toes. Sensory:   Normal to cold and vibration. Gait:  Steady gait. No Romberg. Tremor:   No tremor noted. Cerebellar:  Intact FTN/CRIS/HTS. Anterior head posture with shoulders rotated in  Positive tenderness on palpation right greater than left occiput    Lab Review    Recent Results (from the past 24 hour(s))   CBC W/O DIFF    Collection Time: 04/02/22 11:53 AM   Result Value Ref Range    WBC 8.3 3.6 - 11.0 K/uL    RBC 3.66 (L) 3.80 - 5.20 M/uL    HGB 10.1 (L) 11.5 - 16.0 g/dL    HCT 31.3 (L) 35.0 - 47.0 %    MCV 85.5 80.0 - 99.0 FL    MCH 27.6 26.0 - 34.0 PG    MCHC 32.3 30.0 - 36.5 g/dL    RDW 13.9 11.5 - 14.5 %    PLATELET 004 511 - 281 K/uL    MPV 11.2 8.9 - 12.9 FL    NRBC 0.0 0  WBC    ABSOLUTE NRBC 0.00 0.00 - 2.52 K/uL   METABOLIC PANEL, COMPREHENSIVE    Collection Time: 04/02/22 11:53 AM   Result Value Ref Range    Sodium 136 136 - 145 mmol/L    Potassium 4.2 3.5 - 5.1 mmol/L    Chloride 107 97 - 108 mmol/L    CO2 23 21 - 32 mmol/L    Anion gap 6 5 - 15 mmol/L    Glucose 91 65 - 100 mg/dL    BUN 6 6 - 20 MG/DL    Creatinine 0.74 0.55 - 1.02 MG/DL    BUN/Creatinine ratio 8 (L) 12 - 20      GFR est AA >60 >60 ml/min/1.73m2    GFR est non-AA >60 >60 ml/min/1.73m2    Calcium 9.2 8.5 - 10.1 MG/DL    Bilirubin, total 0.2 0.2 - 1.0 MG/DL    ALT (SGPT) 18 12 - 78 U/L    AST (SGOT) 19 15 - 37 U/L    Alk. phosphatase 127 (H) 45 - 117 U/L    Protein, total 6.1 (L) 6.4 - 8.2 g/dL    Albumin 2.9 (L) 3.5 - 5.0 g/dL    Globulin 3.2 2.0 - 4.0 g/dL    A-G Ratio 0.9 (L) 1.1 - 2.2     PROTEIN/CREATININE RATIO, URINE    Collection Time: 04/02/22  1:11 PM   Result Value Ref Range    Protein, urine random 17 (H) 0.0 - 11.9 mg/dL    Creatinine, urine 100.00 mg/dL    Protein/Creat.  urine Ratio 0.2     COVID-19 RAPID TEST    Collection Time: 04/03/22  3:40 AM   Result Value Ref Range    Specimen source Nasopharyngeal      COVID-19 rapid test Not detected NOTD           Assessment:   Bilateral occipital neuralgia  Cervicogenic headache    Active Problems:    Pregnancy (4/1/2022)      Plan:   Neurological examination is nonfocal.  No visual field defect, visual acuity is normal, funduscopic examination does not reveal any papilledema with good disc margins. No signs of increased intracranial pressure. No indication currently to do any neuroimaging. History and exam reveals elements of occipital neuralgia and cervicogenic headaches due to poor anterior head posture. Patient was advised to correct her anterior head posture. Use headrest at home. Recommend trial of muscle relaxant such as cyclobenzaprine 10 mg daily and up to TID if necessary for maintenance therapy. Continue as needed Fioricet 1-2 every 4-6 hours as needed. If patient develops worsening vision issues with focal neurological complaints and recommend proceeding with brain MRI and MRV to look for venous sinus thrombosis. Thank you for the consult. This note was created using voice recognition software. Despite editing, there may be syntax errors.

## 2022-04-03 NOTE — PROGRESS NOTES
12- SBAR from Pepe Monroe, RN. Care of patient assumed     [address] [facility]- Dr. No Client informed that patient is having irregular heartbeat at this time. MD aware and states that follow up is not necessary at this time as patient has a hx of arrhythmia and was previously taking Labetalol for this. Labetalol has since been held and will continue to be held. Will continue to monitor    1130- Neuro consult complete at this time    1310-Dr. No Client at bedside discussing [facility]- This RN called Dr. David Tipton to discuss POC. 10 mg of Flexeril did not help patient's headache. Patient is still rating pain 5/10. Dr. David Tipton states to give patient 25 mg of IV Phenergan with 2 tabs of Fioricet and then reassess. 56- Dr. David Tipton called and made aware that patient is not feeling any pain relief from medications. MD states she will go to the bedside to discuss POC with patient     1843- Dr. David Tipton at bedside discussing [address] with patient    0- SBAR to MARIS Hawkins, RN. Care of patient released     65- Dr. David Tipton informed of patient's high blood pressure and episode of tachycardia.  MD states to obtain repeat blood pressure in 30 min and continue to monitor

## 2022-04-03 NOTE — ROUTINE PROCESS
1910  Verbal shift change report given to SIA Osman RN (oncoming nurse) by Mary Sorto RN (offgoing nurse). Report included the following information SBAR, Kardex, Intake/Output, MAR, Recent Results and Med Rec Status. 1930  Patient assessment completed. Patient reports a headache of 5/10, no pain medication requested this time. 1940  Per Dr. Vikram Montero, administer 10 mg Compazine if headache persists. 2125  Urine sample taken to lab  Patient is reporting an increase in pain, 7/10, pressure in the entire head, not like a sinus headache. Compazine administered. 1492  Devin Garnett updated. Per MD, COVID test in am, Demerol if pain persists.

## 2022-04-03 NOTE — PROGRESS NOTES
Problem: Patient Education: Go to Patient Education Activity  Goal: Patient/Family Education  Outcome: Progressing Towards Goal     Problem: Pain  Goal: *Control of Pain  Outcome: Not Progressing Towards Goal

## 2022-04-03 NOTE — PROGRESS NOTES
AntePartum Progress Note    La García  38w3d    Patient admitted for HA at 38w3d Estimated Date of Delivery: 22 states she does have mild headache  and normal fetal movement. HA improved with sleep after taking compazine last night but has returned and is currently 5/10 in severity. Vitals:  Patient Vitals for the past 24 hrs:   BP Temp Pulse Resp SpO2   22 0924     97 %   22 0923 119/77 98.6 °F (37 °C) 86 18    22 0340 119/71  86     22 2326 115/64  80     22 2227 113/62  82     22 1925 120/70 98.3 °F (36.8 °C) 94 18 98 %   22 1617 122/76 98.7 °F (37.1 °C) 78 16    22 1607     94 %   22 1457 129/84 98.7 °F (37.1 °C) 73 18    22 1155 133/78 98.7 °F (37.1 °C) 77 16    22 1152     98 %   22 1147     97 %   22 1142     97 %   22 1137     97 %   22 1132     98 %     Temp (24hrs), Av.6 °F (37 °C), Min:98.3 °F (36.8 °C), Max:98.7 °F (37.1 °C)    I&O:   No intake/output data recorded.               1901 -  0700  In: -   Out: 1700 [Urine:1700]  NST:  Reactive  Uterine Activity: None    Exam:  Patient: comfortable without distress               Abdomen gravid                                                      Labs:   Recent Results (from the past 24 hour(s))   CBC W/O DIFF    Collection Time: 22 11:53 AM   Result Value Ref Range    WBC 8.3 3.6 - 11.0 K/uL    RBC 3.66 (L) 3.80 - 5.20 M/uL    HGB 10.1 (L) 11.5 - 16.0 g/dL    HCT 31.3 (L) 35.0 - 47.0 %    MCV 85.5 80.0 - 99.0 FL    MCH 27.6 26.0 - 34.0 PG    MCHC 32.3 30.0 - 36.5 g/dL    RDW 13.9 11.5 - 14.5 %    PLATELET 519 401 - 493 K/uL    MPV 11.2 8.9 - 12.9 FL    NRBC 0.0 0  WBC    ABSOLUTE NRBC 0.00 0.00 - 9.88 K/uL   METABOLIC PANEL, COMPREHENSIVE    Collection Time: 22 11:53 AM   Result Value Ref Range    Sodium 136 136 - 145 mmol/L    Potassium 4.2 3.5 - 5.1 mmol/L    Chloride 107 97 - 108 mmol/L    CO2 23 21 - 32 mmol/L    Anion gap 6 5 - 15 mmol/L    Glucose 91 65 - 100 mg/dL    BUN 6 6 - 20 MG/DL    Creatinine 0.74 0.55 - 1.02 MG/DL    BUN/Creatinine ratio 8 (L) 12 - 20      GFR est AA >60 >60 ml/min/1.73m2    GFR est non-AA >60 >60 ml/min/1.73m2    Calcium 9.2 8.5 - 10.1 MG/DL    Bilirubin, total 0.2 0.2 - 1.0 MG/DL    ALT (SGPT) 18 12 - 78 U/L    AST (SGOT) 19 15 - 37 U/L    Alk. phosphatase 127 (H) 45 - 117 U/L    Protein, total 6.1 (L) 6.4 - 8.2 g/dL    Albumin 2.9 (L) 3.5 - 5.0 g/dL    Globulin 3.2 2.0 - 4.0 g/dL    A-G Ratio 0.9 (L) 1.1 - 2.2     PROTEIN/CREATININE RATIO, URINE    Collection Time: 04/02/22  1:11 PM   Result Value Ref Range    Protein, urine random 17 (H) 0.0 - 11.9 mg/dL    Creatinine, urine 100.00 mg/dL    Protein/Creat. urine Ratio 0.2     COVID-19 RAPID TEST    Collection Time: 04/03/22  3:40 AM   Result Value Ref Range    Specimen source Nasopharyngeal      COVID-19 rapid test Not detected NOTD         Assessment and Plan:   IUP @ 38w3d with intractable HA  1. OB stable  2. 24 hr urine 240mg  3. S/p fiorcet, oxycodone, antihistamine, compazine, demerol for HA, all of which help some but HA immediately returns. 4.  Neuro consult today. Case reviewed with on call MFM who agrees with neuro consultation. 5.  NST TID  6. Regular diet.     Virginie Knight DO, [address] [facility],[address] [facility]

## 2022-04-03 NOTE — PROGRESS NOTES
I have received SBAR from Dr. Carol Saucedo, have assumed care of the patient, and have introduced myself to the patient and her spouse. She continues to complain of a bilateral temporal headache that radiates to her occipital region of her head. She currently reports have a pain score of 7/10 and notes that the various pain medications given to her have not provided adequate relief. Also notes seeing floaters, edema of her hands, feet, and ankles for the past two weeks. Complains of mild occasional ruq pain,  Denies nausea, vomiting, epigastric pain, and notes fetal movement. Has occasional contractions. Reports having elevated blood pressures at work and with home blood pressure cuff starting in late February. She shared the BP measurements that were recorded in her smart phone. Mentions that her highest blood pressure is 178/98. Reports that her mother was diagnosed with migraines during pregnancy.  147/77   136/82   100 palpable  3/1 167/70    She was referred to cardiology because of these findings and has a normal echo. She reports that she started on labetalol for a diagnosis of tachycardia and hypertension. Since starting labetalol her BPs have been normal.    Patient Vitals for the past 24 hrs:   Temp Pulse Resp BP SpO2   22 1923  100  (!) 154/84    22 1553 98.2 °F (36.8 °C) 97 18 122/71 98 %   22 1309  100 18 131/73 97 %   22 0924     97 %   22 0923 98.6 °F (37 °C) 86 18 119/77    22 0340  86  119/71    22 2326  80  115/64    22 2227  82  113/62        Cvs: nrr  Lungs: cta bilaterally  Abdomen: soft, gravid, non tender    I discussed MRI with the patient in light of her persistent headache that is refractory to pain medications and she is amenable. Ass/Plan:  at 38 3/7 wks with headaches that are refractory to pain medication, complex tachycardia, h/o elevated blood pressures at home and work.   Her symptoms could be suggestive of an atypical presentation of preeclampsia. Most recent blood pressure is elevated. Will repeat urine pr:cr ratio  I discussed MRI of the head in light of her recalcitrant headache without known etiology and she is amenable. Will repeat blood pressure in 30 minutes. Made aware that induction of labor would be advised with any evidence of hypertensive disorders of pregnancy. NST now. The patient agrees with the plan of care. 30 minutes was spent talking with the patient, examining the patient, and discussing the plan of care.

## 2022-04-03 NOTE — PROGRESS NOTES
1930: Bedside and Verbal shift change report given to SISSY Donato, RN (oncoming nurse) by MARIS Gifford RN (offgoing nurse). Report included the following information SBAR, Intake/Output, MAR, Recent Results and Quality Measures. 1950: Verbal order from Sulema WOODARD to perform NST and repeat PCR when patient urinates. 2003: This RN at bedside to obtain vitals and provide assessment. 2036: Verbal order from [facility] MD to administer 500 ml fluid bolus. 2145Cneto Miranda MD at patient bedside to discuss plan of care. Will continue to monitor BP and reevaluate at 2330.    2255: This RN at bedside to change and reposition BP cuff.    2327: This RN calling Sulema WOODARD for FHR tachycardia. Verbal order to continue 500 ml IV fluid bolus and initiate fluids at 125 ml/hr. 0000: Verbal order from [facility] MD to continue Labetalol 100mg BID. MRI ordered for 4/4/22.     0004: This RN at bedside to administer Labetalol. Patient states that they had temporarily held Labetalol to assess if medication was masking PIH. Labetalol administered. 0630: This RN at bedside obtaining MRI checklist.    0715: Bedside and Verbal shift change report given to Paula Castro RN (oncoming nurse) by Juana Pablo RN (offgoing nurse). Report included the following information SBAR, Intake/Output, MAR, Recent Results and Quality Measures.

## 2022-04-04 ENCOUNTER — APPOINTMENT (OUTPATIENT)
Dept: MRI IMAGING | Age: 22
End: 2022-04-04
Attending: OBSTETRICS & GYNECOLOGY
Payer: COMMERCIAL

## 2022-04-04 PROBLEM — R52 PAIN: Status: ACTIVE | Noted: 2022-04-04

## 2022-04-04 LAB
ALBUMIN SERPL-MCNC: 2.6 G/DL (ref 3.5–5)
ALBUMIN/GLOB SERPL: 0.8 {RATIO} (ref 1.1–2.2)
ALP SERPL-CCNC: 121 U/L (ref 45–117)
ALT SERPL-CCNC: 15 U/L (ref 12–78)
ANION GAP SERPL CALC-SCNC: 9 MMOL/L (ref 5–15)
AST SERPL-CCNC: 16 U/L (ref 15–37)
BASOPHILS # BLD: 0 K/UL (ref 0–0.1)
BASOPHILS NFR BLD: 0 % (ref 0–1)
BILIRUB SERPL-MCNC: 0.5 MG/DL (ref 0.2–1)
BUN SERPL-MCNC: 9 MG/DL (ref 6–20)
BUN/CREAT SERPL: 12 (ref 12–20)
CALCIUM SERPL-MCNC: 8.8 MG/DL (ref 8.5–10.1)
CHLORIDE SERPL-SCNC: 108 MMOL/L (ref 97–108)
CO2 SERPL-SCNC: 20 MMOL/L (ref 21–32)
CREAT SERPL-MCNC: 0.74 MG/DL (ref 0.55–1.02)
DIFFERENTIAL METHOD BLD: ABNORMAL
EOSINOPHIL # BLD: 0.1 K/UL (ref 0–0.4)
EOSINOPHIL NFR BLD: 1 % (ref 0–7)
ERYTHROCYTE [DISTWIDTH] IN BLOOD BY AUTOMATED COUNT: 13.7 % (ref 11.5–14.5)
GLOBULIN SER CALC-MCNC: 3.2 G/DL (ref 2–4)
GLUCOSE SERPL-MCNC: 77 MG/DL (ref 65–100)
HCT VFR BLD AUTO: 30.1 % (ref 35–47)
HGB BLD-MCNC: 9.8 G/DL (ref 11.5–16)
IMM GRANULOCYTES # BLD AUTO: 0 K/UL (ref 0–0.04)
IMM GRANULOCYTES NFR BLD AUTO: 0 % (ref 0–0.5)
LYMPHOCYTES # BLD: 2.2 K/UL (ref 0.8–3.5)
LYMPHOCYTES NFR BLD: 29 % (ref 12–49)
MCH RBC QN AUTO: 27.6 PG (ref 26–34)
MCHC RBC AUTO-ENTMCNC: 32.6 G/DL (ref 30–36.5)
MCV RBC AUTO: 84.8 FL (ref 80–99)
MONOCYTES # BLD: 0.9 K/UL (ref 0–1)
MONOCYTES NFR BLD: 11 % (ref 5–13)
NEUTS SEG # BLD: 4.3 K/UL (ref 1.8–8)
NEUTS SEG NFR BLD: 59 % (ref 32–75)
NRBC # BLD: 0 K/UL (ref 0–0.01)
NRBC BLD-RTO: 0 PER 100 WBC
PLATELET # BLD AUTO: 146 K/UL (ref 150–400)
PMV BLD AUTO: 11.5 FL (ref 8.9–12.9)
POTASSIUM SERPL-SCNC: 3.9 MMOL/L (ref 3.5–5.1)
PROT SERPL-MCNC: 5.8 G/DL (ref 6.4–8.2)
RBC # BLD AUTO: 3.55 M/UL (ref 3.8–5.2)
SODIUM SERPL-SCNC: 137 MMOL/L (ref 136–145)
WBC # BLD AUTO: 7.4 K/UL (ref 3.6–11)

## 2022-04-04 PROCEDURE — 65270000029 HC RM PRIVATE

## 2022-04-04 PROCEDURE — 74011250636 HC RX REV CODE- 250/636: Performed by: OBSTETRICS & GYNECOLOGY

## 2022-04-04 PROCEDURE — 74011250637 HC RX REV CODE- 250/637: Performed by: OBSTETRICS & GYNECOLOGY

## 2022-04-04 PROCEDURE — G0378 HOSPITAL OBSERVATION PER HR: HCPCS

## 2022-04-04 PROCEDURE — 74011250636 HC RX REV CODE- 250/636: Performed by: STUDENT IN AN ORGANIZED HEALTH CARE EDUCATION/TRAINING PROGRAM

## 2022-04-04 PROCEDURE — 59200 INSERT CERVICAL DILATOR: CPT | Performed by: OBSTETRICS & GYNECOLOGY

## 2022-04-04 PROCEDURE — 74011250637 HC RX REV CODE- 250/637: Performed by: STUDENT IN AN ORGANIZED HEALTH CARE EDUCATION/TRAINING PROGRAM

## 2022-04-04 PROCEDURE — 85025 COMPLETE CBC W/AUTO DIFF WBC: CPT

## 2022-04-04 PROCEDURE — 36415 COLL VENOUS BLD VENIPUNCTURE: CPT

## 2022-04-04 PROCEDURE — 74011000258 HC RX REV CODE- 258: Performed by: STUDENT IN AN ORGANIZED HEALTH CARE EDUCATION/TRAINING PROGRAM

## 2022-04-04 PROCEDURE — 80053 COMPREHEN METABOLIC PANEL: CPT

## 2022-04-04 PROCEDURE — 75410000002 HC LABOR FEE PER 1 HR: Performed by: OBSTETRICS & GYNECOLOGY

## 2022-04-04 RX ORDER — HYDRALAZINE HYDROCHLORIDE 20 MG/ML
10 INJECTION INTRAMUSCULAR; INTRAVENOUS
Status: ACTIVE | OUTPATIENT
Start: 2022-04-04 | End: 2022-04-05

## 2022-04-04 RX ORDER — OXYTOCIN/RINGER'S LACTATE 30/500 ML
0-20 PLASTIC BAG, INJECTION (ML) INTRAVENOUS
Status: DISCONTINUED | OUTPATIENT
Start: 2022-04-04 | End: 2022-04-05

## 2022-04-04 RX ORDER — LABETALOL HYDROCHLORIDE 5 MG/ML
80 INJECTION, SOLUTION INTRAVENOUS
Status: ACTIVE | OUTPATIENT
Start: 2022-04-04 | End: 2022-04-05

## 2022-04-04 RX ORDER — MAGNESIUM SULFATE HEPTAHYDRATE 40 MG/ML
2 INJECTION, SOLUTION INTRAVENOUS CONTINUOUS
Status: DISCONTINUED | OUTPATIENT
Start: 2022-04-04 | End: 2022-04-06

## 2022-04-04 RX ORDER — LABETALOL 100 MG/1
100 TABLET, FILM COATED ORAL EVERY 12 HOURS
Status: DISCONTINUED | OUTPATIENT
Start: 2022-04-04 | End: 2022-04-07 | Stop reason: HOSPADM

## 2022-04-04 RX ORDER — MAGNESIUM SULFATE HEPTAHYDRATE 40 MG/ML
4 INJECTION, SOLUTION INTRAVENOUS ONCE
Status: COMPLETED | OUTPATIENT
Start: 2022-04-04 | End: 2022-04-04

## 2022-04-04 RX ORDER — BUTORPHANOL TARTRATE 2 MG/ML
1 INJECTION INTRAMUSCULAR; INTRAVENOUS
Status: DISCONTINUED | OUTPATIENT
Start: 2022-04-04 | End: 2022-04-05

## 2022-04-04 RX ORDER — LABETALOL HYDROCHLORIDE 5 MG/ML
40 INJECTION, SOLUTION INTRAVENOUS
Status: ACTIVE | OUTPATIENT
Start: 2022-04-04 | End: 2022-04-05

## 2022-04-04 RX ORDER — CALCIUM GLUCONATE 20 MG/ML
1 INJECTION, SOLUTION INTRAVENOUS AS NEEDED
Status: DISCONTINUED | OUTPATIENT
Start: 2022-04-04 | End: 2022-04-07 | Stop reason: HOSPADM

## 2022-04-04 RX ORDER — LABETALOL HYDROCHLORIDE 5 MG/ML
20 INJECTION, SOLUTION INTRAVENOUS
Status: DISPENSED | OUTPATIENT
Start: 2022-04-04 | End: 2022-04-05

## 2022-04-04 RX ADMIN — BUTORPHANOL TARTRATE 1 MG: 2 INJECTION, SOLUTION INTRAMUSCULAR; INTRAVENOUS at 13:28

## 2022-04-04 RX ADMIN — SODIUM CHLORIDE, POTASSIUM CHLORIDE, SODIUM LACTATE AND CALCIUM CHLORIDE 125 ML/HR: 600; 310; 30; 20 INJECTION, SOLUTION INTRAVENOUS at 06:48

## 2022-04-04 RX ADMIN — SODIUM CHLORIDE 12.5 MG: 9 INJECTION, SOLUTION INTRAVENOUS at 13:28

## 2022-04-04 RX ADMIN — OXYTOCIN 2 MILLI-UNITS/MIN: 10 INJECTION, SOLUTION INTRAMUSCULAR; INTRAVENOUS at 21:16

## 2022-04-04 RX ADMIN — LABETALOL HYDROCHLORIDE 100 MG: 100 TABLET, FILM COATED ORAL at 21:16

## 2022-04-04 RX ADMIN — MAGNESIUM SULFATE HEPTAHYDRATE 4 G: 40 INJECTION, SOLUTION INTRAVENOUS at 10:27

## 2022-04-04 RX ADMIN — Medication 25 MCG: at 16:44

## 2022-04-04 RX ADMIN — MAGNESIUM SULFATE IN WATER 2 G/HR: 40 INJECTION, SOLUTION INTRAVENOUS at 22:38

## 2022-04-04 RX ADMIN — LABETALOL HYDROCHLORIDE 100 MG: 100 TABLET, FILM COATED ORAL at 08:04

## 2022-04-04 RX ADMIN — Medication 25 MCG: at 13:29

## 2022-04-04 RX ADMIN — LABETALOL HYDROCHLORIDE 100 MG: 100 TABLET, FILM COATED ORAL at 00:04

## 2022-04-04 RX ADMIN — MAGNESIUM SULFATE IN WATER 2 G/HR: 40 INJECTION, SOLUTION INTRAVENOUS at 10:46

## 2022-04-04 RX ADMIN — BUTALBITAL, ACETAMINOPHEN, AND CAFFEINE 2 TABLET: 50; 325; 40 TABLET ORAL at 06:47

## 2022-04-04 RX ADMIN — Medication 25 MCG: at 10:41

## 2022-04-04 RX ADMIN — SODIUM CHLORIDE, POTASSIUM CHLORIDE, SODIUM LACTATE AND CALCIUM CHLORIDE 125 ML/HR: 600; 310; 30; 20 INJECTION, SOLUTION INTRAVENOUS at 22:38

## 2022-04-04 NOTE — PROGRESS NOTES
0715: SBAR report received from   56: RN at bedside for initial assessment, BP elevated and Sulema WOODARD notified immediately. 26: RN at bedside holding BP cable due to port issue on EFM, manual BP taken. 0830: Per Dr Deon Fernandez, patient may take shower before cervical balloon placement. 0900: Patient moved to room 203 from 204 due to malfunctioning BP cable port with movement and error issues (IT ticket placed). 2711: Dr Deon Fernandez at bedside for [facility] catheter placement. Inserted easily and procedure well tolerated by patient. 80ml/80ml in uterine and cervical balloons. 1020: RN at bedside for initial bolus of mag sulfate, remains at bedside throughout infusion. [id number]: Patient reports cramping and low back pain, feels slightly nauseated from it and would like pain medication and something for nausea for the back pain she is having. 1335: BP elevated, Pk notified, per DR start labetalol protocol if there is another elevated pressure, elevated pressures could be related to pain. 1500: Patient sleeping, will continue to monitor  1900: SBAR report given to PEDRO Simon RN, transfer of patient care complete at this time.

## 2022-04-04 NOTE — PROGRESS NOTES
Progress Note  Patient seen, fetal heart rate and contraction pattern evaluated at 0750. Pt discussed at length with Dr. Praveen Acevedo, events overnight significant for continue HA despite multiple medical modalities (narcotic, tylenol, fioricet, muscle relaxant). Over the last 12 hours she has had 3 mild range BPs. This AM has what she describes as a mild HA 5-7/10 that has been her baseline over the last 1 week. Denies vision changes, RUQ pain, or worsening edme. a     Physical Exam:  Vitals:   Vitals:    04/03/22 2218 04/03/22 2258 04/03/22 2343 04/04/22 0648   BP: 135/87 (!) 150/72 124/78 (!) 154/81   Pulse: (!) 154 100 (!) 120 90   Resp:    16   Temp:    98.3 °F (36.8 °C)   SpO2: 100%   100%         Cervical Exam was examined   2 cm dilated    70% effaced    -2 station    Presenting Part: cephalic  Membranes intact    Uterine Activity[de-identified] None  Fetal Heart Rate: Reactive  Baseline: 150 per minute  Variability: moderate  Accelerations: yes  Decelerations: none    Recent Results (from the past 12 hour(s))   PROTEIN/CREATININE RATIO, URINE    Collection Time: 04/03/22  8:45 PM   Result Value Ref Range    Protein, urine random 24 (H) 0.0 - 11.9 mg/dL    Creatinine, urine 136.00 mg/dL    Protein/Creat. urine Ratio 0.2     CBC WITH AUTOMATED DIFF    Collection Time: 04/04/22  7:51 AM   Result Value Ref Range    WBC 7.4 3.6 - 11.0 K/uL    RBC 3.55 (L) 3.80 - 5.20 M/uL    HGB 9.8 (L) 11.5 - 16.0 g/dL    HCT 30.1 (L) 35.0 - 47.0 %    MCV 84.8 80.0 - 99.0 FL    MCH 27.6 26.0 - 34.0 PG    MCHC 32.6 30.0 - 36.5 g/dL    RDW 13.7 11.5 - 14.5 %    PLATELET 966 (L) 550 - 400 K/uL    MPV 11.5 8.9 - 12.9 FL    NRBC 0.0 0  WBC    ABSOLUTE NRBC 0.00 0.00 - 0.01 K/uL    NEUTROPHILS 59 32 - 75 %    LYMPHOCYTES 29 12 - 49 %    MONOCYTES 11 5 - 13 %    EOSINOPHILS 1 0 - 7 %    BASOPHILS 0 0 - 1 %    IMMATURE GRANULOCYTES 0 0.0 - 0.5 %    ABS. NEUTROPHILS 4.3 1.8 - 8.0 K/UL    ABS. LYMPHOCYTES 2.2 0.8 - 3.5 K/UL    ABS.  MONOCYTES 0.9 0.0 - 1.0 K/UL    ABS. EOSINOPHILS 0.1 0.0 - 0.4 K/UL    ABS. BASOPHILS 0.0 0.0 - 0.1 K/UL    ABS. IMM.  GRANS. 0.0 0.00 - 0.04 K/UL    DF AUTOMATED         Assessment/Plan:  Ms. Carmen Barker is admitted with pregnancy at 38w4d with persistent HA and now mild range BPs consistent with at minimum GHTN mild range BPs, Pr:Cr 0.2 (with severe features HA) will plan to move forward with induction/delivery    HA remains at her baseline, no focal deficits present   Cook 80/80 and oral miso for cervical ripening   Magnesium for seizure PPx given persistent HA   Continue home labetalol dose     Cadence Marin DO  4/4/2022  8:02 AM

## 2022-04-04 NOTE — PROGRESS NOTES
Pt seen and examined by me. Report received from outgoing physician, Dr. Anusha iFnch. Still has HA 5/10, but not as bad as it has been. No visual changes. FB removed without difficulty  SVE: 4-5/50/ ballotable  FHT: 150, Cat I  [place]: occasional          A/ G1 at 38 weeks with pre eclampsia with severe features. GBS negative, FB out      P/ pitocin, AROM when able. Continue Magnesium.

## 2022-04-04 NOTE — PROGRESS NOTES
1910 Bedside and Verbal shift change report given to [address] (oncoming nurse) by Lamin Childress (offgoing nurse). Report included the following information SBAR, Kardex, Procedure Summary, Intake/Output, MAR and Recent Results. 1945 RN at bedside, pt requesting magnesium be turned off at this time. RN spoke with Dr Mirlande Zuniga MD about pt request, MD states pt needs magnesium and will come talk with pt.     1947 RN and MD at bedside. MD explained why Magnesium is needed and educated pt at this time. Pt agrees to continue with Magnesium. 1949 RN and MD at bedside. Ana Grew balloon out at this time, MD gave verbal orders for Pitocin to be started 4 hours after last cytotec dose. Time allotted for questions and all questions answered. Pt agrees to pitocin augmentation. 0710 Bedside and Verbal shift change report given to Maryann Gr (oncoming nurse) by [address] (offgoing nurse). Report included the following information SBAR, Kardex, Procedure Summary, Intake/Output, MAR, Recent Results and Med Rec Status.

## 2022-04-04 NOTE — ADT AUTH CERT NOTES
INPATIENT ADMISSION NOTIFICATION     ADMISSION DATE 22    MOM LABORED FOR 3 DAYS- STILL IN HOUSE     [facility] ICR IS DOWN AND I AM UNABLE TO START AN [address] ON THE SITE- PLEASE START AN AUTH FOR THIS PATIENT     BABY IS PENDING ADMISSION - BORN 22-  I WILL SEND UPDATED DELIVERY CLINICAL WHEN AVAILABLE     UR CONTACT IS BIANCA PRYOR   UR RETURN FAX [phone]  UR PHONE/VOICEMAil- [phone]    [address] YOU SO MUCH! [address]     FACILITY NPI : [id number]  FACILITY TAX ID : [id number]     [facility]  [facility] 2 LABOR & DELIVERY  [address] [facility]  [phone]            Patient Name :Hipolito Black   : 2000 (21 yrs)  MRN : [id number]     Patient Mailing Address [address] [address] [facility] [02] , [address]                                                             .         Insurance Plan Payor: Maximus Walker / Plan: [place] / Product Type: PPO /      Primary Coverage Subscriber ID : [id number]        Current Patient Class : INPATIENT  Admit Date : 2022     REQUESTED LEVEL OF CARE: INPATIENT [101]                                                         OBSERVATION [104]  Diagnosis : Pain                     Pregnancy     ICD10 Code : Pregnancy [Z34.90]  Pain [R52]     Current Room and Bed      Admitting and Attending Info:  Admitting Provider : Julio C Dillard MD     NPI: [id number]  Admitting Provider Phone.  [phone]  Admitting Provider Address:   [address] [place] [id number]              Patient Demographics    Patient Name   Wilbur Davenport Legal Sex   Female    2000 Address   [facility],[address]   [address] [place] [address] Phone   [phone] [facility])   [phone] [facility] Account    Name Acct ID Class Status Primary Coverage   Wilbur Davenport [id number] INPATIENT Open BLUE CROSS - [place]            Guarantor Account (for Hospital Account [de-identified])    Name Relation to Pt Service Area Active? Acct Type   Gina Lawrence Self [facility] Yes Personal/Family   Address Phone     [address] [facility],[address]   Marisa Lyon 7 [phone](P)              Coverage Information (for Hospital Account [de-identified])    F/O Payor/Plan Subscriber  Subscriber Sex Precert #   BLUE CROSS/[place] BL[place]OSS FEDERAL [facility]    Subscriber Subscriber #   Tyson Bateman [id number]   Grp # Group Name   [id number]    Address Phone   [address]    Policy Number Status Effective Date Benefits Phone   [id number] -  -   Auth/Cert               Diagnosis     Codes Comments   Acute nonintractable headache, unspecified headache type  ICD-10-CM: R51.9   ICD-9-CM: 385. 0             Admission Information    Arrival Date/Time: 2022 1444 Admit Date/Time: 2022 1444 IP Adm.  Date/Time: 2022 0621   Admission Type: Unknown Point of Origin: Non-health Care Facility/self Admit Category:    Means of Arrival:  Primary Service: Obstetrics Secondary Service: N/A   Transfer Source:  Service Area: [facility] Unit: [facility] 2 LABOR & DELIVERY   Admit Provider: Kristie Newell MD Attending Provider: Góemz Ribera MD Referring Provider:      Admission Information    Attending Provider Admission Dx Admitted on   Gómez Ribera MD Pregnancy, Pain 22   Service Isolation Code Status   OBSTETRICS -- Full Code   Allergies Advance Care Planning    No Known Allergies Jump to the Activity       Admission Information    Unit/Bed: [facility] 2 LABOR & DELIVERY/ Service: OBSTETRICS   Admitting provider: Kristie Newell MD Phone: [phone]   Attending provider: Gómez Ribera MD Phone: [phone]   PCP: LA Fernandez Phone: [phone]   Admission dx: pregnancy Patient class: I   Admission type: UNK       Patient Demographics    [facility]   [id number] Legal Sex   Female    2000 Address   [address] [facility] [facility] Phone   [phone] [facility])   [phone] (Mobile)     H&P Notes       H&P by Kenzie Lira DO at 22 documented on Admission (Current) from 2022 in [facility] 2 LABOR & DELIVERY    Author: Kenzie Lira DO Author Type: Physician Filed: 22   Note Status: Signed Cosign: Cosign Not Required Date of Service: 22   : Kenzie Lira DO (Physician)               Expand AllCollapse All    Ob Triage Note     Pt is a 19 y. o.female.  at 38w1d presents from the office for PreE evaluation. She reports HA that started yesterday and is not relieved for more than 2 hrs with Tylenol. She has occasional vision changes. Currently on labetalol 100 mg po bid for tachycardia. The patient denies LOF, vaginal bleeding, N/V/F/C. Pt reports good fetal movement.   Pregnancy uncomplicated.          Past Medical History:   Diagnosis Date    Essential hypertension      Thyroid activity decreased       pt was previouslt medicated before pregnancy with spironlactone      Visit Vitals  /80   Pulse 95   SpO2 98%      No data found.     EXAM:  Cervical Exam:  deferred  Membranes:  Intact  Uterine Activity: None  Fetal Heart Rate: Reactive cat 1     Labs:  Recent Results         Recent Results (from the past 12 hour(s))   METABOLIC PANEL, COMPREHENSIVE     Collection Time: 22  3:13 PM   Result Value Ref Range     Sodium 136 136 - 145 mmol/L     Potassium 3.8 3.5 - 5.1 mmol/L     Chloride 105 97 - 108 mmol/L     CO2 21 21 - 32 mmol/L     Anion gap 10 5 - 15 mmol/L     Glucose 98 65 - 100 mg/dL     BUN 7 6 - 20 MG/DL     Creatinine 0.68 0.55 - 1.02 MG/DL     BUN/Creatinine ratio 10 (L) 12 - 20       GFR est AA >60 >60 ml/min/1.73m2     GFR est non-AA >60 >60 ml/min/1.73m2     Calcium 9.2 8.5 - 10.1 MG/DL     Bilirubin, total 0.3 0.2 - 1.0 MG/DL     ALT (SGPT) 19 12 - 78 U/L     AST (SGOT) 21 15 - 37 U/L     Alk. phosphatase 137 (H) 45 - 117 U/L     Protein, total 6.4 6.4 - 8.2 g/dL     Albumin 3.0 (L) 3.5 - 5.0 g/dL     Globulin 3.4 2.0 - 4.0 g/dL     A-G Ratio 0.9 (L) 1.1 - 2.2     CBC WITH AUTOMATED DIFF     Collection Time: 04/01/22  3:13 PM   Result Value Ref Range     WBC 8.7 3.6 - 11.0 K/uL     RBC 3.62 (L) 3.80 - 5.20 M/uL     HGB 10.2 (L) 11.5 - 16.0 g/dL     HCT 31.3 (L) 35.0 - 47.0 %     MCV 86.5 80.0 - 99.0 FL     MCH 28.2 26.0 - 34.0 PG     MCHC 32.6 30.0 - 36.5 g/dL     RDW 13.8 11.5 - 14.5 %     PLATELET 274 513 - 868 K/uL     MPV 11.8 8.9 - 12.9 FL     NRBC 0.0 0  WBC     ABSOLUTE NRBC 0.00 0.00 - 0.01 K/uL     NEUTROPHILS 66 32 - 75 %     LYMPHOCYTES 22 12 - 49 %     MONOCYTES 10 5 - 13 %     EOSINOPHILS 1 0 - 7 %     BASOPHILS 0 0 - 1 %     IMMATURE GRANULOCYTES 1 (H) 0.0 - 0.5 %     ABS. NEUTROPHILS 5.8 1.8 - 8.0 K/UL     ABS. LYMPHOCYTES 1.9 0.8 - 3.5 K/UL     ABS. MONOCYTES 0.9 0.0 - 1.0 K/UL     ABS. EOSINOPHILS 0.1 0.0 - 0.4 K/UL     ABS. BASOPHILS 0.0 0.0 - 0.1 K/UL     ABS. IMM. GRANS. 0.0 0.00 - 0.04 K/UL     DF AUTOMATED     PROTEIN/CREATININE RATIO, URINE     Collection Time: 04/01/22  3:13 PM   Result Value Ref Range     Protein, urine random 13 (H) 0.0 - 11.9 mg/dL     Creatinine, urine 60.00 mg/dL     Protein/Creat. urine Ratio 0.2              No results found for this or any previous visit.           ASSESSMENT:  IUP at 38w1d r/o preE     PLAN:  Labs wnl, PCR 0.2  Fiorcet did not help HA, will try oxycodone. Plan 24 hr urine.   Follow up after oxycodone to see if HA has improved.       Laura Yancey DO, [address] [facility],[address] [facility]                     H&P by Glenda Gomez CNM at 03/15/22 0045 documented on Admission (Discharged) from 3/14/2022 in [place] [address]    Author: Glenda Gomez CNM Author Type: Midwife Filed: 03/15/22 0103   Note Status: Signed Cosign: Cosigned by Steve Garcia MD at 03/15/22 8817 Date of Service: 03/15/22 0045   : Elkin Simental CNM (Midwife)                     ANASTACIO History & Physical     Subjective:      Mikey Boyer is a 19 y.o. female  SIUP @ 35w5d by Estimated Date of Delivery: 22 who arrives to L&D with chief complaint of leaking of fluid that started on 3/13 around 2200. She had to go to work today, so she delayed coming in until she got off. She reports having intercourse prior to the sensation of leaking. She denies any continued leaking, needing to change underwear or wear a pad. She   endorses +FM; Pt denies vaginal bleeding, fever/chills, chest pain, SOB, HA, scotomata, sudden swelling, nor malaise. She reports that this pregnancy has been complicated by maternal tachycardia for which she recently saw cardiology for and was placed on labetalol BID this week.      Pregnancy problems include:      -        Patient Active Problem List   Diagnosis Code    Tachycardia R00.0         Allergies  - No Known Allergies     Prenatal Labs           OB History           OB History         1    Para   0    Term   0       0    AB   0    Living   0        SAB   0    IAB   0    Ectopic   0    Molar   0    Multiple   0    Live Births   0                   PMHx       Past Medical History:   Diagnosis Date    Essential hypertension      Thyroid activity decreased       pt was previouslt medicated before pregnancy with spironlactone         PSHx  History reviewed. No pertinent surgical history.      F/SHx  History reviewed. No pertinent family history.    Social History            Socioeconomic History    Marital status: SINGLE       Spouse name: Not on file    Number of children: Not on file    Years of education: Not on file    Highest education level: Not on file   Occupational History    Not on file   Tobacco Use    Smoking status: Never Smoker    Smokeless tobacco: Never Used   Vaping Use    Vaping Use: Never used   Substance and Sexual Activity    Alcohol use: Not Currently    Drug use: Never    Sexual activity: Not on file   Other Topics Concern     Service Not Asked    Blood Transfusions Not Asked    Caffeine Concern Not Asked    Occupational Exposure Not Asked    Hobby Hazards Not Asked    Sleep Concern Not Asked    Stress Concern Not Asked    Weight Concern Not Asked    Special Diet Not Asked    Back Care Not Asked    Exercise Not Asked    Bike Helmet Not Asked   [address] Not Asked    Self-Exams Not Asked   Social History Narrative    Not on file      Social Determinants of Health          Financial Resource Strain:     Difficulty of Paying Living Expenses: Not on file   Food Insecurity:     Worried About Running Out of Food in the Last Year: Not on file    Aaliyah of Food in the Last Year: Not on file   Transportation Needs:     Lack of Transportation (Medical): Not on file    Lack of Transportation (Non-Medical): Not on file   Physical Activity:     Days of Exercise per Week: Not on file    Minutes of Exercise per Session: Not on file   Stress:     Feeling of Stress : Not on file   Social Connections:     Frequency of Communication with Friends and Family: Not on file    Frequency of Social Gatherings with Friends and Family: Not on file    Attends Anglican Services: Not on file    Active Member of [address] [place] [address] [employer] or Organizations: Not on file    Attends Club or Organization Meetings: Not on file    Marital Status: Not on file   Intimate Partner Violence:     Fear of Current or Ex-Partner: Not on file    Emotionally Abused: Not on file    Physically Abused: Not on file    Sexually Abused: Not on file   Housing Stability:     Unable to Pay for Housing in the Last Year: Not on file    Number of [place] in the Last Year: Not on file    Unstable Housing in the Last Year: Not on file         Home Medications:  Prior to Admission Medications   Prescriptions Last Dose Informant Patient Reported?  Taking?   labetaloL (NORMODYNE) 100 mg tablet 3/15/2022 at Unknown time   Yes Yes   Sig: Take 100 mg by mouth two (2) times a day. Facility-Administered Medications: None         Review of Systems:  A comprehensive review of systems was negative except for that written in the History of Present Illness.        Objective:           Vitals:     03/15/22 0017 03/15/22 0020   BP: 126/71     Pulse: 98     Resp: 18     Temp: 98.1 °F (36.7 °C)     SpO2: 96% 96%      There is no height or weight on file to calculate BMI. Physical Exam:  General:  Alert, cooperative, no distress, appears stated age. Lungs:   Symmetrical expansion, non-labored   Heart:  Regular rate   GI/Abdomen:   Soft, non-tender. Gravid. Extremities: Extremities normal, atraumatic, no cyanosis or edema. Skin: Skin color, texture, turgor normal. No rashes or lesions   /Cervical Exam: no lesions or erythema  Cervix:  deferred- patient not feeling contraction   Fetal Presentation: Vertex by Leopolds  Pelvimetry: Adequate  Fetal Size: AGA      Electronic Fetal Monitoring                           Fetal Heart Rate:   140, moderate variability, +accels, no decels, cat 1 FHT        Uterine Contractions: External, occasional, relaxed to palpation     Amnisure- negative      Assessment:   19 y.o. female  SIUP @  35w5 d by DILSHAD of Estimated Date of Delivery: 22   diagnoses:              -IUP at 35w5d              -no signs of rupture of membranes or labor                               Pregnancy problems include:   -        Patient Active Problem List   Diagnosis Code    Tachycardia R00.0            EFM:   - Category 1  - NST: Fetal NST Findings: reactive with indication of establishing a baseline fetal evaluation conducted over a minimum of 20 minutes     Plan:   Admit to discharge home in stable condition      Patient has an appointment on Wednesday  Discussed PTL precautions and FKC.    Advised patient to discuss new medication with provider at her visit        Patient has been counseled regarding this plan of care and is in agreement; all questions answered.     Collaborative MD: Dr. Luciano Reno By:  Kady Snider CNM      3/15/2022 12:46 AM                      Patient Demographics    Patient Name   Marvin March   [id number] Legal Sex   Female    2000 Address   [address] [facility]   [address] [address] Phone   [phone] (Home)   [phone] (Mobile)   CSN:   [id number]     [address] [place] [address] Date: Admit Time Room Bed   2022  2:44  2200       Attending Providers    Provider Pager From To   Ricardo Love MD  22      Emergency Contact(s)    Name Relation Home Work Mobile   Su Rios Mother [phone]     Utilization Reviews         Hypertensive Disorders of Pregnancy - Care Day 2 (2022) by Jameson Tellez RN       Review Entered Review Status   2022 12:44 Completed      Criteria Review      Care Day: 2 Care Date: 2022 Level of Care:    Guideline Day 2    Clinical Status    (X) * Blood pressure normal or adequately controlled    2022 12:43:57 EDT by Jameson Tellez      VS: 98.7, 77, 133/78, 16, 98% RA    (X) * No seizure activity identified    (X) * Laboratory values normal or improved    2022 12:43:57 EDT by Sonia Turnerr h/h 10.1/31.3,    ( ) * Fetal status acceptable    ( ) * Delivery not indicated imminently    ( ) * Discharge plans and education understood    Activity    ( ) * Ambulatory or acceptable for next level of care    Routes    (X) * Oral hydration    ( ) * Oral medications or regimen acceptable for next level of care    (X) * Oral diet or acceptable for next level of care    2022 12:43:57 EDT by Jameson Tellez      reg    Interventions    (X) Fetal monitoring, including daily fetal movements    2022 12:43:57 EDT by Jameson Tellez      NST    * Milestone   Additional Notes    L&D      Tx: vitals per routine      Plan:   1.  OB stable   2.  Collecting 24 hr urine 3.  Fiorcet for HA   4.  Repeat labs and PCR now. Exam:   Patient: comfortable without distress                Abdomen soft, gravid, NT                Fundus NT                Lower extremities NT without edema                                             Hypertensive Disorders of Pregnancy - Care Day 1 (2022) by Glenys Petit RN       Review Entered Review Status   2022 08:10 Completed      Criteria Review      Care Day: 1 Care Date: 2022 Level of Care:    Guideline Day 1    Level Of Care    (X) Obstetric floor    Clinical Status    ( ) * Clinical Indications met    2022 08:10:37 EDT by Glenys Petit      VS: 98.4, 95, 128/80, 16, 98% RA    (X) Possible headache, visual disturbance, proteinuria    2022 08:10:37 EDT by Glenys Petit      headache    Routes    (X) Oral hydration, oral or parenteral medications    2022 08:10:37 EDT by Glenys Petit      fioricet -40mg po q4 prn x1, oxycodone 10mg po q4 prn x1    (X) Diet as tolerated    2022 08:10:37 EDT by Glenys Petit      reg diet    Interventions    (X) Urinalysis, CBC, platelet count, creatinine, transaminases    2022 08:10:37 EDT by Glenys Petit      wbc 8.7, h/h 10.2/31.3, platelets 953, BUN 7/ creat 0.68, ast 21/ alt 19, alk phos 137, urine protein 13, creat urine 60    (X) Establish gestational age    2022 08:10:37 EDT by Mayra Homans 38w1d    (X) Frequent vital signs and monitoring, including blood pressure, reflexes, urine protein    (X) Fetal testing    2022 08:10:37 EDT by Mayra Homans NST    * Milestone   Additional Notes    L&D      Tx:   vitals per routine      HPI:   19 y. o.female.  at 38w1d presents from the office for PreE evaluation.  She reports HA that started yesterday and is not relieved for more than 2 hrs with Tylenol.  She has occasional vision changes.  Currently on labetalol 100 mg po bid for tachycardia.     The patient denies LOF, vaginal bleeding, N/V/F/C.  Pt reports good fetal movement.  Pregnancy uncomplicated.       Plan:   Labs wnl, PCR 0.2  Fiorcet did not help HA, will try oxycodone.  Plan 24 hr urine.  Follow up after oxycodone to see if HA has improved.        Exam:   Cervical Exam:  deferred   Membranes:  Intact   Uterine Activity: None   Fetal Heart Rate: Reactive cat 1        Hypertensive Disorders of Pregnancy - Clinical Indications for Admission to Inpatient Care by Yadi Ayala RN       Review Entered Review Status   4/2/2022 08:08 Completed      Criteria Review      Clinical Indications for Admission to Inpatient Care    Most Recent : Yadi Ayala Most Recent Date: 4/2/2022 03:61:90 EDT    ( ) Admission is indicated for  1 or more  of the following  (1) (2) (3) (4) (5):       ( ) Preeclampsia [B] with severe features (ie, severe preeclampsia), as indicated by  1 or more        of the following :          ( ) Severe hypertension (SBP greater than or equal to 160 mm Hg or DBP greater than or equal to          110 mm Hg)          4/2/2022 08:08:22 EDT by Yadi Ayala            /80 w/ headache

## 2022-04-05 ENCOUNTER — ANESTHESIA (OUTPATIENT)
Dept: LABOR AND DELIVERY | Age: 22
End: 2022-04-05
Payer: COMMERCIAL

## 2022-04-05 ENCOUNTER — ANESTHESIA EVENT (OUTPATIENT)
Dept: LABOR AND DELIVERY | Age: 22
End: 2022-04-05
Payer: COMMERCIAL

## 2022-04-05 PROCEDURE — 0KQM0ZZ REPAIR PERINEUM MUSCLE, OPEN APPROACH: ICD-10-PCS | Performed by: OBSTETRICS & GYNECOLOGY

## 2022-04-05 PROCEDURE — 65410000002 HC RM PRIVATE OB

## 2022-04-05 PROCEDURE — 74011000250 HC RX REV CODE- 250: Performed by: ANESTHESIOLOGY

## 2022-04-05 PROCEDURE — 75410000002 HC LABOR FEE PER 1 HR: Performed by: OBSTETRICS & GYNECOLOGY

## 2022-04-05 PROCEDURE — 74011250636 HC RX REV CODE- 250/636: Performed by: OBSTETRICS & GYNECOLOGY

## 2022-04-05 PROCEDURE — 74011000258 HC RX REV CODE- 258: Performed by: STUDENT IN AN ORGANIZED HEALTH CARE EDUCATION/TRAINING PROGRAM

## 2022-04-05 PROCEDURE — 74011250637 HC RX REV CODE- 250/637: Performed by: STUDENT IN AN ORGANIZED HEALTH CARE EDUCATION/TRAINING PROGRAM

## 2022-04-05 PROCEDURE — 74011250636 HC RX REV CODE- 250/636: Performed by: STUDENT IN AN ORGANIZED HEALTH CARE EDUCATION/TRAINING PROGRAM

## 2022-04-05 PROCEDURE — 75410000003 HC RECOV DEL/VAG/CSECN EA 0.5 HR: Performed by: OBSTETRICS & GYNECOLOGY

## 2022-04-05 PROCEDURE — 00HU33Z INSERTION OF INFUSION DEVICE INTO SPINAL CANAL, PERCUTANEOUS APPROACH: ICD-10-PCS | Performed by: ANESTHESIOLOGY

## 2022-04-05 PROCEDURE — 76060000078 HC EPIDURAL ANESTHESIA: Performed by: ANESTHESIOLOGY

## 2022-04-05 PROCEDURE — 75410000000 HC DELIVERY VAGINAL/SINGLE: Performed by: OBSTETRICS & GYNECOLOGY

## 2022-04-05 PROCEDURE — 74011000250 HC RX REV CODE- 250: Performed by: OBSTETRICS & GYNECOLOGY

## 2022-04-05 PROCEDURE — 65270000029 HC RM PRIVATE

## 2022-04-05 PROCEDURE — 10907ZC DRAINAGE OF AMNIOTIC FLUID, THERAPEUTIC FROM PRODUCTS OF CONCEPTION, VIA NATURAL OR ARTIFICIAL OPENING: ICD-10-PCS | Performed by: OBSTETRICS & GYNECOLOGY

## 2022-04-05 PROCEDURE — 74011250637 HC RX REV CODE- 250/637: Performed by: OBSTETRICS & GYNECOLOGY

## 2022-04-05 RX ORDER — ONDANSETRON 4 MG/1
4 TABLET, ORALLY DISINTEGRATING ORAL
Status: ACTIVE | OUTPATIENT
Start: 2022-04-05 | End: 2022-04-06

## 2022-04-05 RX ORDER — OXYTOCIN/RINGER'S LACTATE 30/500 ML
10 PLASTIC BAG, INJECTION (ML) INTRAVENOUS AS NEEDED
Status: DISCONTINUED | OUTPATIENT
Start: 2022-04-05 | End: 2022-04-07 | Stop reason: HOSPADM

## 2022-04-05 RX ORDER — DIPHENHYDRAMINE HCL 25 MG
25 CAPSULE ORAL
Status: DISCONTINUED | OUTPATIENT
Start: 2022-04-05 | End: 2022-04-07 | Stop reason: HOSPADM

## 2022-04-05 RX ORDER — OXYTOCIN/RINGER'S LACTATE 30/500 ML
87.3 PLASTIC BAG, INJECTION (ML) INTRAVENOUS AS NEEDED
Status: DISCONTINUED | OUTPATIENT
Start: 2022-04-05 | End: 2022-04-07 | Stop reason: HOSPADM

## 2022-04-05 RX ORDER — FENTANYL/BUPIVACAINE/NS/PF 2-1250MCG
12 PREFILLED PUMP RESERVOIR EPIDURAL CONTINUOUS
Status: DISCONTINUED | OUTPATIENT
Start: 2022-04-05 | End: 2022-04-05

## 2022-04-05 RX ORDER — DOCUSATE SODIUM 100 MG/1
100 CAPSULE, LIQUID FILLED ORAL
Status: DISCONTINUED | OUTPATIENT
Start: 2022-04-05 | End: 2022-04-07 | Stop reason: HOSPADM

## 2022-04-05 RX ORDER — SIMETHICONE 80 MG
80 TABLET,CHEWABLE ORAL
Status: DISCONTINUED | OUTPATIENT
Start: 2022-04-05 | End: 2022-04-07 | Stop reason: HOSPADM

## 2022-04-05 RX ORDER — SODIUM CHLORIDE 0.9 % (FLUSH) 0.9 %
5-40 SYRINGE (ML) INJECTION EVERY 8 HOURS
Status: DISCONTINUED | OUTPATIENT
Start: 2022-04-05 | End: 2022-04-05

## 2022-04-05 RX ORDER — IBUPROFEN 800 MG/1
800 TABLET ORAL EVERY 8 HOURS
Status: DISCONTINUED | OUTPATIENT
Start: 2022-04-05 | End: 2022-04-05

## 2022-04-05 RX ORDER — SODIUM CHLORIDE 0.9 % (FLUSH) 0.9 %
5-40 SYRINGE (ML) INJECTION AS NEEDED
Status: DISCONTINUED | OUTPATIENT
Start: 2022-04-05 | End: 2022-04-07 | Stop reason: HOSPADM

## 2022-04-05 RX ORDER — EPHEDRINE SULFATE/0.9% NACL/PF 50 MG/5 ML
10 SYRINGE (ML) INTRAVENOUS
Status: DISCONTINUED | OUTPATIENT
Start: 2022-04-05 | End: 2022-04-05

## 2022-04-05 RX ORDER — ZOLPIDEM TARTRATE 5 MG/1
5 TABLET ORAL
Status: DISCONTINUED | OUTPATIENT
Start: 2022-04-05 | End: 2022-04-07 | Stop reason: HOSPADM

## 2022-04-05 RX ORDER — MINERAL OIL
OIL (ML) ORAL
Status: DISCONTINUED
Start: 2022-04-05 | End: 2022-04-05

## 2022-04-05 RX ORDER — ONDANSETRON 2 MG/ML
4 INJECTION INTRAMUSCULAR; INTRAVENOUS
Status: DISCONTINUED | OUTPATIENT
Start: 2022-04-05 | End: 2022-04-07 | Stop reason: HOSPADM

## 2022-04-05 RX ORDER — LIDOCAINE HYDROCHLORIDE AND EPINEPHRINE 15; 5 MG/ML; UG/ML
INJECTION, SOLUTION EPIDURAL
Status: SHIPPED | OUTPATIENT
Start: 2022-04-05 | End: 2022-04-05

## 2022-04-05 RX ORDER — CARBOPROST TROMETHAMINE 250 UG/ML
250 INJECTION, SOLUTION INTRAMUSCULAR ONCE
Status: COMPLETED | OUTPATIENT
Start: 2022-04-05 | End: 2022-04-05

## 2022-04-05 RX ORDER — NALOXONE HYDROCHLORIDE 0.4 MG/ML
0.4 INJECTION, SOLUTION INTRAMUSCULAR; INTRAVENOUS; SUBCUTANEOUS AS NEEDED
Status: DISCONTINUED | OUTPATIENT
Start: 2022-04-05 | End: 2022-04-07 | Stop reason: HOSPADM

## 2022-04-05 RX ORDER — IBUPROFEN 800 MG/1
800 TABLET ORAL EVERY 8 HOURS
Status: DISCONTINUED | OUTPATIENT
Start: 2022-04-05 | End: 2022-04-07 | Stop reason: HOSPADM

## 2022-04-05 RX ORDER — ACETAMINOPHEN 325 MG/1
650 TABLET ORAL
Status: DISCONTINUED | OUTPATIENT
Start: 2022-04-05 | End: 2022-04-07 | Stop reason: HOSPADM

## 2022-04-05 RX ADMIN — LABETALOL HYDROCHLORIDE 100 MG: 100 TABLET, FILM COATED ORAL at 09:33

## 2022-04-05 RX ADMIN — ONDANSETRON 4 MG: 2 INJECTION INTRAMUSCULAR; INTRAVENOUS at 13:01

## 2022-04-05 RX ADMIN — IBUPROFEN 800 MG: 800 TABLET, FILM COATED ORAL at 20:53

## 2022-04-05 RX ADMIN — MAGNESIUM SULFATE IN WATER 2 G/HR: 40 INJECTION, SOLUTION INTRAVENOUS at 07:32

## 2022-04-05 RX ADMIN — LABETALOL HYDROCHLORIDE 100 MG: 100 TABLET, FILM COATED ORAL at 20:53

## 2022-04-05 RX ADMIN — LIDOCAINE HYDROCHLORIDE AND EPINEPHRINE 3 ML: 15; 5 INJECTION, SOLUTION EPIDURAL at 09:46

## 2022-04-05 RX ADMIN — BUTORPHANOL TARTRATE 1 MG: 2 INJECTION, SOLUTION INTRAMUSCULAR; INTRAVENOUS at 05:13

## 2022-04-05 RX ADMIN — Medication 12 ML/HR: at 15:22

## 2022-04-05 RX ADMIN — CARBOPROST TROMETHAMINE 250 MCG: 250 INJECTION, SOLUTION INTRAMUSCULAR at 17:21

## 2022-04-05 RX ADMIN — MAGNESIUM SULFATE IN WATER 2 G/HR: 40 INJECTION, SOLUTION INTRAVENOUS at 17:46

## 2022-04-05 RX ADMIN — BUTALBITAL, ACETAMINOPHEN, AND CAFFEINE 2 TABLET: 50; 325; 40 TABLET ORAL at 08:00

## 2022-04-05 RX ADMIN — Medication 12 ML/HR: at 10:04

## 2022-04-05 RX ADMIN — SODIUM CHLORIDE 12.5 MG: 9 INJECTION, SOLUTION INTRAVENOUS at 05:13

## 2022-04-05 RX ADMIN — SODIUM CHLORIDE, POTASSIUM CHLORIDE, SODIUM LACTATE AND CALCIUM CHLORIDE 999 ML/HR: 600; 310; 30; 20 INJECTION, SOLUTION INTRAVENOUS at 09:28

## 2022-04-05 RX ADMIN — OXYTOCIN 500 MILLI-UNITS/MIN: 10 INJECTION, SOLUTION INTRAMUSCULAR; INTRAVENOUS at 18:00

## 2022-04-05 RX ADMIN — MEPERIDINE HYDROCHLORIDE 50 MG: 25 INJECTION INTRAMUSCULAR; INTRAVENOUS; SUBCUTANEOUS at 01:56

## 2022-04-05 RX ADMIN — SODIUM CHLORIDE, POTASSIUM CHLORIDE, SODIUM LACTATE AND CALCIUM CHLORIDE 75 ML/HR: 600; 310; 30; 20 INJECTION, SOLUTION INTRAVENOUS at 16:19

## 2022-04-05 NOTE — PROGRESS NOTES
Labor Progress Note  Patient seen, fetal heart rate and contraction pattern evaluated at 1255    Physical Exam:  Cervical Exam was examined   9 cm dilated    100% effaced    +1 station    Presenting Part: cephalic  Cervical Position: anterior  Consistency: Soft    Membranes:  clear  Uterine Activity[de-identified] Frequency: Every 3 minutes  Fetal Heart Rate: Reactive    Assessment/Plan:    Reassuring fetal status, Continue plan for vaginal delivery. Comfortable with epidural. Recheck in an hour and start pushing when fully dilated  Reassuring fetal status. Continue current managent.   Parnell Bosworth, MD  4/5/2022  1:13 PM

## 2022-04-05 NOTE — PROGRESS NOTES
0700: Bedside and Verbal shift change report given to Nam Ventura, RN and Aneesh Lang, ZHAO (oncoming nurse) by Clint Hodgson RN  (offgoing nurse). Report included the following information SBAR, Kardex and MAR.     2613:  at bedside. SVE performed. 5/50. Water broken. Clear fluid. Pads changed. 5598: Dr. Jada Sierra at bedside to place epidural.     1000: Epidural procedure complete. Pt repositioned back in bed on left lateral side. 1020: Dr. aJda Sierra made aware pt is not feeling any relief on her left side. 1025: Dr. Jada Sierra at bedside. Patient reports pain only on left side. Dr. Woodrow Mcardle epidural line. 1032: Dr. Jada Sierra made aware pt is not getting any relief from recent bolus. MD stated he would be at bedside when he can. 1048: Dr. Jada Sierra at bedside. Patient still reporting no relief. 1050: Time out performed. Patient agreeable to second epidural.     1059: epidural procedure complete, pt repositioned back in bed.    1105: Pt reports relief with epidural and is comfortable. 1415: Patient actively pushing. RN remains in continuous attendance at the bedside. Assessment & evaluation of fetal heart rate ongoing via continuous EFM. 1655: Called Dr. Carlos Wagoner to bedside. 1705: Dr. Carlos Wagoner at bedside. 1715: RN remained at bedside throughout pushing. EFM continuously assessed. Vaginal delivery of viable infant.

## 2022-04-05 NOTE — ANESTHESIA PREPROCEDURE EVALUATION
Relevant Problems   No relevant active problems       Anesthetic History   No history of anesthetic complications            Review of Systems / Medical History  Patient summary reviewed and pertinent labs reviewed    Pulmonary  Within defined limits                 Neuro/Psych         Headaches  Pertinent negatives: No seizures, TIA and CVA   Cardiovascular    Hypertension            Pertinent negatives: No past MI, angina and CHF  Exercise tolerance: >4 METS     GI/Hepatic/Renal  Within defined limits           Pertinent negatives: No renal disease   Endo/Other      Hypothyroidism  Obesity  Pertinent negatives: No diabetes   Other Findings   Comments: Pre-E with severe features           Physical Exam    Airway  Mallampati: III  TM Distance: 4 - 6 cm  Neck ROM: normal range of motion   Mouth opening: Normal     Cardiovascular      Rate: normal         Dental  No notable dental hx       Pulmonary  Breath sounds clear to auscultation               Abdominal  GI exam deferred       Other Findings            Anesthetic Plan    ASA: 3  Anesthesia type: epidural            Anesthetic plan and risks discussed with: Patient

## 2022-04-05 NOTE — DISCHARGE SUMMARY
Patient Rafa Res  [id number]  40 y.o.  2000    Admit Date: 2022    Discharge Date: 22     Admitting Physician: Min Coronado MD    Attending Physician: Bc Barnhart MD    Admission Diagnoses: Pregnancy [Z34.90]  Pain [R52]    Procedures for this admission:     Discharge Diagnoses: Same as above with vaginally producing a viable infant. Information for the patient's :  Arturo Emerson [[id number]]            Discharge Disposition:  home    Discharge Condition:  stable    Additional Diagnoses: headache, elevated bps. Maternal Labs:   Lab Results   Component Value Date/Time    HBsAg, External Negative 2021 12:00 AM    HIV, External Negative 2021 12:00 AM    Rubella, External Immune 2021 12:00 AM    RPR, External Non-Reactive 2021 12:00 AM    GrBStrep, External Negative 2022 12:00 AM       Cord Blood Results:   Information for the patient's :  Arturo Emerson [[id number]]   No results found for: PCTABR, ABORH, PCTDIG, BILI, ABORH, ABORHEXT           History of Present Illness:   OB History        1    Para   1    Term   1       0    AB   0    Living   1       SAB   0    IAB   0    Ectopic   0    Molar   0    Multiple   0    Live Births   1              Admitted for headache, developed htn and induced. Hospital Course:   Patient was admitted as above and delivered via vagina . Please the chart for details. The postpartum course was unremarkable. She was deemed stable for discharge home on day 2.     Follow-up Care: 2 weeks        Signed:  Jinx Goldberg, MD  2022  5:54 PM

## 2022-04-05 NOTE — PROGRESS NOTES
Labor Progress Note  Patient seen, fetal heart rate and contraction pattern evaluated at 0820    Physical Exam:  Cervical Exam was examined   5 cm dilated    60% effaced    -2 station    Presenting Part: cephalic  Cervical Position: mid position  Consistency: Soft    Membranes:  Intact  Uterine Activity[de-identified] Frequency: Every 3-5 minutes  Fetal Heart Rate: Reactive    Assessment/Plan:    Reassuring fetal status, Continue plan for vaginal delivery, AROM, clear fluid. om Magnesium. Headache better. Pitocin. Reassuring fetal status. Continue current managent.   Nikki Mares MD  4/5/2022  8:59 AM

## 2022-04-05 NOTE — PROGRESS NOTES
-This RN orienting Amrik Lopez RN, charting and care will be overseen by this RN. 1255: Dr. Yinka Kidd at bedside, SVE per MD 9.5/100/0. Md stated to recheck pt in an hour and start pushing if she is fully dilated. 1900: Bedside and Verbal shift change report given to [address] [place] [address] Drive (oncoming nurse) by Demetris Mckeon RN (offgoing nurse). Report included the following information SBAR, Kardex, Intake/Output, MAR, Recent Results and Med Rec Status.

## 2022-04-05 NOTE — L&D DELIVERY NOTE
Delivery Summary    Patient: Vanessa Chaudhari MRN: [id number]  SSN: [id number]    YOB: 2000  Age: 19 y.o. Sex: female       Information for the patient's :  Kendra Richardson [[id number]]       Labor Events:    Labor: No    Steroids: None   Cervical Ripening Date/Time: 2022 9:25 AM   Cervical Ripening Type: Rowe/EASI   Antibiotics During Labor: No   Rupture Identifier:      Rupture Date/Time: 2022 8:21 AM   Rupture Type: AROM   Amniotic Fluid Volume: Moderate    Amniotic Fluid Description: Clear    Amniotic Fluid Odor: None    Induction: Rowe Bulb (balloon)       Induction Date/Time:        Indications for Induction: Mild Preeclampsia    Augmentation: Oxytocin   Augmentation Date/Time:      Indications for Augmentation:     Labor complications: Additional complications:        Delivery Events:  Indications For Episiotomy:     Episiotomy:     Perineal Laceration(s): 2nd   Repaired:     Periurethral Laceration Location:      Repaired:     Labial Laceration Location:     Repaired:     Sulcal Laceration Location:     Repaired:     Vaginal Laceration Location:     Repaired:     Cervical Laceration Location:     Repaired:     Repair Suture: Vicryl 2-0   Number of Repair Packets:     Estimated Blood Loss (ml): 350ml   Quantitative Blood Loss (ml)                Delivery Date: 2022    Delivery Time: 5:15 PM  Delivery Type: Vaginal, Spontaneous  Sex:  Female    Gestational Age: 44w7d   Delivery Clinician:     Living Status: Living   Delivery Location: L&D            APGARS  One minute Five minutes Ten minutes   Skin color:            Heart rate:            Grimace:            Muscle tone:            Breathing: Totals:                Presentation: Vertex    Position: Left Occiput Transverse  Resuscitation Method:        Meconium Stained:        Cord Information:    Complications:    Cord around:    Delayed cord clamping?     Cord clamped date/time: Disposition of Cord Blood:      Blood Gases Sent?:      Placenta:  Date/Time: 2022  5:19 PM  Removal: Spontaneous      Appearance: Normal;Intact      Measurements:  Birth Weight:        Birth Length:        Head Circumference:        Chest Circumference:       Abdominal Girth: Other Providers:    , Obstetrician;Primary Nurse;Primary  Nurse;Nicu Nurse;Neonatologist;Anesthesiologist;Crna;Nurse Practitioner;Midwife;Nursery Nurse           Group B Strep:   Lab Results   Component Value Date/Time    GrBStrep, External Negative 2022 12:00 AM     Information for the patient's :  James Mayo [[id number]]   No results found for: ABORH, PCTABR, PCTDIG, BILI, ABORHEXT, ABORH     No results for input(s): PCO2CB, PO2CB, HCO3I, SO2I, IBD, PTEMPI, SPECTI, PHICB, ISITE, IDEV, IALLEN in the last 72 hours.

## 2022-04-05 NOTE — ANESTHESIA PROCEDURE NOTES
Epidural Block    Patient location during procedure: OB  Start time: 4/5/2022 9:46 AM  End time: 4/5/2022 10:01 AM  Reason for block: labor epidural  Staffing  Performed: attending   Anesthesiologist: Frank oWng MD  Preanesthetic Checklist  Completed: patient identified, IV checked, site marked, risks and benefits discussed, surgical consent, monitors and equipment checked, pre-op evaluation and timeout performed  Block Placement  Patient position: sitting  Prep: ChloraPrep  Sterility prep: cap, drape, gloves, hand and mask  Sedation level: no sedation  Patient monitoring: continuous pulse oximetry, frequent blood pressure checks and heart rate  Approach: midline  Location: lumbar  Lumbar location: L3-L4  Epidural  Loss of resistance technique: air and saline  Guidance: landmark technique  Needle  Needle type: Tuohy   Needle gauge: 17 G  Needle length: 10 cm  Needle insertion depth: 8 cm  Catheter type: multi-orifice  Catheter size: 20 G  Catheter at skin depth: 12 cm  Catheter securement method: clear occlusive dressing and liquid medical adhesive  Test dose: negative  Medications Administered  Lidocaine-EPINEPHrine (XYLOCAINE) 1.5 %-1:200,000 Epidural, 3 mL  Assessment  Number of attempts: 1  Procedure assessment: patient tolerated procedure well with no immediate complications

## 2022-04-05 NOTE — PROGRESS NOTES
7:04 PM  SBAR report received from Franklin Woodruff RN. Assumed care of the patient. 7:23 PM  Assessment with fundal check and mag check performed. Patient states her legs are still numb and she has no pain. She has no complaints at this time. 9:20 PM  Assisted the patient to the bathroom with no difficulty. Patient voided and zora care performed. 7:19 AM  SBAR report given to Yaniv Fajardo RN. Care of the patient turned over.

## 2022-04-06 PROCEDURE — 74011250636 HC RX REV CODE- 250/636: Performed by: STUDENT IN AN ORGANIZED HEALTH CARE EDUCATION/TRAINING PROGRAM

## 2022-04-06 PROCEDURE — 74011250637 HC RX REV CODE- 250/637: Performed by: STUDENT IN AN ORGANIZED HEALTH CARE EDUCATION/TRAINING PROGRAM

## 2022-04-06 PROCEDURE — 65270000029 HC RM PRIVATE

## 2022-04-06 RX ADMIN — DOCUSATE SODIUM 100 MG: 100 CAPSULE, LIQUID FILLED ORAL at 20:46

## 2022-04-06 RX ADMIN — SODIUM CHLORIDE, POTASSIUM CHLORIDE, SODIUM LACTATE AND CALCIUM CHLORIDE 75 ML/HR: 600; 310; 30; 20 INJECTION, SOLUTION INTRAVENOUS at 06:13

## 2022-04-06 RX ADMIN — IBUPROFEN 800 MG: 800 TABLET, FILM COATED ORAL at 22:49

## 2022-04-06 RX ADMIN — MAGNESIUM SULFATE IN WATER 2 G/HR: 40 INJECTION, SOLUTION INTRAVENOUS at 04:13

## 2022-04-06 RX ADMIN — ACETAMINOPHEN 650 MG: 325 TABLET ORAL at 20:45

## 2022-04-06 RX ADMIN — IBUPROFEN 800 MG: 800 TABLET, FILM COATED ORAL at 14:27

## 2022-04-06 RX ADMIN — ACETAMINOPHEN 650 MG: 325 TABLET ORAL at 12:40

## 2022-04-06 RX ADMIN — IBUPROFEN 800 MG: 800 TABLET, FILM COATED ORAL at 05:50

## 2022-04-06 RX ADMIN — LABETALOL HYDROCHLORIDE 100 MG: 100 TABLET, FILM COATED ORAL at 20:46

## 2022-04-06 NOTE — PROGRESS NOTES
PostPartum Note    Anuradha Maloney  [id number]  2000  19 y.o.    S:  Ms. Anuradha Maloney is a 19 y.o.  PPD #1 s/p  @ 70Y2J complicated by preeclampsia with severe features (HA). Doing well. She had a baby girl. Her lochia is like a period. She describes her pain as mild and is well controlled with PO medications. She is breast feeding and this is going well. Tolerating PO intake. On IV magnesium. Denies toxic symptoms. O:   Visit Vitals  /68   Pulse 90   Temp 98.2 °F (36.8 °C)   Resp 18   Ht 5' 8\" (1.727 m)   Wt 112.9 kg (249 lb)   SpO2 98%   Breastfeeding Unknown   BMI 37.86 kg/m²       Lab Results   Component Value Date/Time    WBC 7.4 2022 07:51 AM    HGB 9.8 (L) 2022 07:51 AM    HCT 30.1 (L) 2022 07:51 AM    PLATELET 878 (L) 5139 07:51 AM    MCV 84.8 2022 07:51 AM    Hgb, External 12.2 2021 12:00 AM    Hct, External 34.5 2021 12:00 AM       Gen - No acute distress  Abdomen - Fundus firm, below the umbilicus   Ext - Warm, well perfused. Nontender, patellar reflexes 2+    A/P:  PPD #1 s/p   @ 13B2U complicated by preeclampsia with severe features, on IV magnesium, stable  1. Preeclampsia with severe features -- BP well controlled. Hold paramaters for labetalol. IV magnesium for 24 hrs postpartum. 2.  Routine PP instructions/ care discussed  3. Blood type - Rh positive  4. Rubella immune  5. Circumcision n/a   6. Discharge pending postpartum course and BP stability    7. F/U 1-2 weeks for PP check.       Maximo Ibrahim MD  [facility]

## 2022-04-06 NOTE — PROGRESS NOTES
0720: Bedside and Verbal shift change report given to YANDEL Granger RN (oncoming nurse) by Kymberly Perales RN (offgoing nurse). Report included the following information SBAR, Kardex, Procedure Summary, Intake/Output, MAR, Recent Results, Med Rec Status and Quality Measures. 0730: RN and NATHALY Pierre SN rounding and assessing pt at this time. Pt states she no longer has a HA. Pt denies HA/SOB/visiual changes/ RUQ pain. 1017: Per Dr. Driss Solis, hold labetalol unless BP increases. MD states if pt not c/o nausea, may have reg diet. Pt taken menu at this time. 1426: Dr. Driss Solis called d/t magneisum infusion being done and if pt needs new bag to be run. BPs reviewed with MD. This RN stating that pt has no pre-e sxs. Per MD- Cherelle Yadi can be d/c'd at this time. 1810: TRANSFER - OUT REPORT:    Verbal report given to DAVID Banegas RN(name) on [employer]  being transferred to MIU(unit) for routine progression of care       Report consisted of patients Situation, Background, Assessment and   Recommendations(SBAR). Information from the following report(s) SBAR, Kardex, Procedure Summary, Intake/Output, MAR, Recent Results, Med Rec Status and Quality Measures was reviewed with the receiving nurse. Lines:   Peripheral IV 04/02/22 Anterior; Left Hand (Active)   Site Assessment Clean, dry, & intact 04/06/22 0132   Phlebitis Assessment 0 04/06/22 0132   Infiltration Assessment 0 04/06/22 0132   Dressing Status Clean, dry, & intact 04/06/22 0132   Dressing Type Tape;Transparent 04/06/22 0132   Hub Color/Line Status Pink; Infusing 04/06/22 0132   Action Taken Open ports on tubing capped 04/03/22 2003   Alcohol Cap Used Yes 04/05/22 0286        Opportunity for questions and clarification was provided.       Patient transported with:   Registered Nurse

## 2022-04-06 NOTE — OP NOTES
[facility] 77  OPERATIVE REPORT    Name:  Michelle Brooks  MR#:  [id number]  :  2000  ACCOUNT #:  [de-identified]  DATE OF SERVICE:  2022        SURGEON:  Rubin Grace MD    COMPLICATIONS:  There were no complications. .    ESTIMATED BLOOD LOSS:  Less than 500 mL. PROCEDURE:  At 0715 hours, there was normal spontaneous vaginal delivery of a live female infant, maternal effort was suboptimal.  The anterior shoulder was already at the introitus, but there was still no effort from the mother, so I went in and delivered the posterior shoulder first, which was the left shoulder and then the baby was delivered easily with a lot of encouragement to the mother to enhance maternal effort. [de-identified] sex is female, Apgar 7 at 1 minute and 9 at 5 minutes respectively. IV Pitocin was running. Placenta was delivered spontaneously. Uterus was massaged for contractility. It remained subcontractile for a little bit, so she received 1 dose of Hemabate 250 mcg intramuscularly, which helps to enhance the contractility. She had a small second-degree perineal laceration, which was sutured in layers with 0 Vicryl and 2-0 Vicryl respectively. Pelvic and rectal examination done, found to be intact. Fetal heart was category 1. Both mother and baby were in stable condition at the end of the delivery.       MD GARO Bradshaw/[email or web address]  D:  2022 17:58  T:  2022 2:55  JOB #:  [id number]

## 2022-04-06 NOTE — LACTATION NOTE
This note was copied from a baby's chart. Mother on MagSulf therapy until 65 tonight. Has been feeding the baby approx every 3 hours. Defined cues of hunger and mom taught to feed on demand to early cues as opposed to scheduling feeds. Reviewed hand expression and to offer 10-20 drops of colostrum for any non feeds. Skin to skin encouraged. Will follow for any lactation needs. Discussed with mother her plan for feeding. Reviewed the benefits of exclusive breast milk feeding during the hospital stay. Informed her of the risks of using formula to supplement in the first few days of life as well as the benefits of successful breast milk feeding; referred her to the Breastfeeding booklet about this information. She acknowledges understanding of information reviewed and states that it is her plan to breastfeed her infant. Will support her choice and offer additional information as needed. Reviewed breastfeeding basics:  How milk is made and normal  breastfeeding behaviors discussed. Supply and demand,  stomach size, early feeding cues, skin to skin bonding with comfortable positioning and baby led latch-on reviewed. How to identify signs of successful breastfeeding sessions reviewed; education on asymetrical latch, signs of effective latching vs shallow, in-effective latching, normal  feeding frequency and duration and expected infant output discussed. Normal course of breastfeeding discussed including the AAP's recommendation that children receive exclusive breast milk feedings for the first six months of life with breast milk feedings to continue through the first year of life and/or beyond as complimentary table foods are added. Breastfeeding Booklet and Warm line information provided with discussion.   Discussed typical  weight loss and the importance of pediatrician appointment within 24-48 hours of discharge, at 2 weeks of life and normalcy of requesting pediatric weight checks as needed in between visits. Hand Expression Education:  Mom taught how to manually hand express her colostrum. Emphasized the importance of providing infant with valuable colostrum as infant rests skin to skin at breast.  Aware to avoid extended periods of non-feeding. Aware to offer 10-20+ drops of colostrum every 2-3 hours until infant is latching and nursing effectively. Taught the rationale behind this low tech but highly effective evidence based practice. Pt will successfully establish breastfeeding by feeding in response to early feeding cues   or wake every 3h, will obtain deep latch, and will keep log of feedings/output. Taught to BF at hunger cues and or q 2-3 hrs and to offer 10-20 drops of hand expressed colostrum at any non-feeds.       Breast Assessment  Left Breast: Medium  Left Nipple: Flat,Intact (everts with stimulation)  Right Breast: Medium  Right Nipple: Flat,Intact (everts with stimulation)  Breast- Feeding Assessment  Attends Breast-Feeding Classes: No  Breast-Feeding Experience: No  Breast Trauma/Surgery: No  Type/Quality: Good  Lactation Consultant Visits  Breast-Feedings:  (did not see baby at breast)

## 2022-04-06 NOTE — ADT AUTH CERT NOTES
4/3/2022 Review by Julius Michele       Review Status Review Entered   In Primary 4/5/2022 16:21      Criteria Review   4/5/2022 Continued Stay Review  L/D     Neurology progress note:  Assessment:  Bilateral occipital neuralgia  Cervicogenic headache     Active Problems:    Pregnancy (4/1/2022)     Ob/Gyn progress note:  Assessment and Plan:   IUP @ 38w3d with intractable HA  1. OB stable  2. 24 hr urine 240mg  3. S/p fiorcet, oxycodone, antihistamine, compazine, demerol for HA, all of which help some but HA immediately returns. 4.  Neuro consult today. Case reviewed with on call MFM who agrees with neuro consultation. 5.  NST TID  6. Regular diet     4/3/2022 20:45  Creatinine, urine: 136.00  Protein, urine random: 24 (H)     Vitals: Temp 98.0, , 112, 120, R 18, B/P 154/84, 127/92, 150/72 and O2 98% RA     Medications:  Fioricet 2 tablets PO QID PRN x1  Flexeril 10mg PO x1  LR 125ml/hr IV  Demerol 50mg IV x1  Phenergan 25mg IV x1           Hypertensive Disorders of Pregnancy - Care Day 4 (4/4/2022) by Julius Michele       Review Status Review Entered   Completed 4/5/2022 12:50      Criteria Review      Care Day: 4 Care Date: 4/4/2022 Level of Care: Inpatient Floor    Guideline Day 2    Clinical Status    (X) * Blood pressure normal or adequately controlled    4/5/2022 12:50:56 EDT by Julius Michele      B/P 154/81, 200/99, 147/60    (X) * No seizure activity identified    4/5/2022 12:50:56 EDT by Julius Michele      none noted    (X) * Laboratory values normal or improved    4/5/2022 12:50:56 EDT by Julius Michele      4/4/2022 07:51  WBC: 7.4  RBC: 3.55 (L)  HGB: 9.8 (L)  HCT: 30.1 (L)  PLATELET: 663 (L)  CO2: 20 (L)  Protein, total: 5.8 (L)  Albumin: 2.6 (L)  A-G Ratio: 0.8 (L)  Alk.  phosphatase: 121 (H)    (X) * Fetal status acceptable    (X) * Delivery not indicated imminently    4/5/2022 12:50:56 EDT by Flower Escalera    ( ) * Discharge plans and education understood    Activity    (X) * Ambulatory or acceptable for next level of care    Routes    (X) * Oral hydration    (X) * Oral medications or regimen acceptable for next level of care    (X) * Oral diet or acceptable for next level of care    Medications    (X) Oral antihypertensives    4/5/2022 12:50:56 EDT by Sarah Rodríguez      Labetalol 100mg PO BID    * Milestone   Additional Notes   DATE: 4/4/2022 Continued Stay Review - L/D      Ob/Gyn progress note:   Assessment/Plan:   38w4d with persistent HA and now mild range BPs consistent with at minimum GHTN mild range BPs, Pr:Cr 0.2 (with severe features HA) will plan to move forward with induction/delivery       HA remains at her baseline, no focal deficits present    Cook 80/80 and oral miso for cervical ripening    Magnesium for seizure PPx given persistent HA    Continue home labetalol dose       Vitals: Temp 98.7, HR 84, R 20, B/P 154/81, 200/99, 147/60 and O2 98% RA      Medications:   Fioricet 2 tablets PO QID PRN x1   Stadol 1mg IV x1   Labetalol 100mg PO BID   LR 125ml/hr IV   Mag sulfate 50ml/hr IV   Mag sulfate 4g IV x1   Phenergan 12.5mg IV x1

## 2022-04-06 NOTE — ROUTINE PROCESS
SBAR IN Report: Mother    Verbal report received from Cherie Sinclair RN (full name & credentials) on this patient, who is now being transferred from  and D (unit) for routine progression of care. The patient is not wearing a green \"Anesthesia-Duramorph\" band. Report consisted of patient's Situation, Background, Assessment and Recommendations (SBAR). [place] ID bands were compared with the identification form, and verified with the patient and transferring nurse. Information from the SBAR, Kardex, Intake/Output and MAR and the [place] Report was reviewed with the transferring nurse; opportunity for questions and clarification provided.

## 2022-04-07 VITALS
OXYGEN SATURATION: 100 % | HEIGHT: 68 IN | DIASTOLIC BLOOD PRESSURE: 81 MMHG | TEMPERATURE: 98.8 F | RESPIRATION RATE: 16 BRPM | HEART RATE: 80 BPM | SYSTOLIC BLOOD PRESSURE: 140 MMHG | WEIGHT: 249 LBS | BODY MASS INDEX: 37.74 KG/M2

## 2022-04-07 PROCEDURE — 74011250637 HC RX REV CODE- 250/637: Performed by: STUDENT IN AN ORGANIZED HEALTH CARE EDUCATION/TRAINING PROGRAM

## 2022-04-07 RX ORDER — LABETALOL 100 MG/1
100 TABLET, FILM COATED ORAL EVERY 12 HOURS
Qty: 60 TABLET | Refills: 0 | Status: SHIPPED | OUTPATIENT
Start: 2022-04-07

## 2022-04-07 RX ADMIN — LABETALOL HYDROCHLORIDE 100 MG: 100 TABLET, FILM COATED ORAL at 08:24

## 2022-04-07 RX ADMIN — IBUPROFEN 800 MG: 800 TABLET, FILM COATED ORAL at 08:24

## 2022-04-07 NOTE — DISCHARGE INSTRUCTIONS
Discharge Instructions for Vaginal Delivery    Patient ID:  Matilda Barry  [id number]  9 y.o.  2000    Take Home Medications           Continue taking your prenatal vitamins if you are breastfeeding. Follow-up Appointment:  Follow-up with vpfw in 2 weeks. Follow-up care is a key part of your treatment and safety. Be sure to make and go to all appointments, and call your doctor if you are having problems. Its also a good idea to know your test results and keep a list of the medicines you take. Activity  Avoid anything in your vagina for 6 weeks (no intercourse, tampons, or douching). You may drive unless you are taking prescription pain medications. Climbing stairs and light lifting are ok after a vaginal delivery unless your doctor tells you not to. You may gradually work up to exercise over the next few weeks, but take it easy- you'll be tired! Diet  You may eat a regular diet but you may want to avoid heavy, greasy foods and other foods that could increase constipation. Wound care  If you have stitches, continue to rinse with a squirt bottle of warm water each time you use the bathroom for about 2 weeks. Your stitches will gradually dissolve over several weeks. Sitz baths are also helpful to keep the wound clean, encourage healing, and to help with pain associated with the stitches or hemorrhoids. You can use either a sitz bath basin or a bathtub filled with 2-3\" inches of plain warm water. Soak for 10 minutes 3 times a day. Pain Management  If you were not given a prescription pain medication, you can take over the counter pain medicines like Tylenol (acetominophen), Advil or Motrin (ibuprofen). You can take acetominophen and ibuprofen together, alternating doses every few hours.   You will get the most relief if you take the maximum dose:  · Tylenol or acetominophen 1000 mg every 6 hours (equivalent to 2 Extra Strength Tylenols every 6 hours)  · Motrin or Advil (generic ibuprofen) 800 mg every 8 hours (4 tablets or capsules every 8 hours)  If you were given a prescription pain medication, you can take ibuprofen along with it (doses as above), but not Tylenol. Use ibuprofen as the main medication, and take the prescription medication if needed for more severe pain not relieved by the ibuprofen. Your goal should be to take only the minimum necessary amounts of the prescription medication (narcotic), as these pain medicines can be habit-forming and will worsen or cause constipation. Most patients will find that within a couple of days, their pain is adequately controlled using only over-the-counter medications. A heating pad can also be very helpful for cramping or back pain. Over-the-counter hemorrhoid wipes and ointments are fine to use if you have hemorrhoids. Constipation  You may find that bowel movements are irregular after delivery and that you have a tendency to be constipated. If you have stitches (and especially if you had a more severe tear called a third- or fourth-degree), your bowel movements will be more comfortable if they are soft. A stool softener such as Colace (docusate) can safely be taken daily if needed. If you become constipated you can use a laxative such as Dulcolax, Miralax or Milk of Magnesia. Don't wait until constipation is severe- take something sooner rather than later and you will feel much better! Your Recovery: What to Expect at Home  Delivering a baby is hard work and you probably aren't getting much sleep, so you will certainly be tired. Try to rest when you can and don't worry about doing housework or other tasks which can wait. If you're experiencing soreness or swelling in your bottom, this should improve over the next few days to 2 weeks. You are likely to have some back pain or general body aches or muscle soreness. This should improve with acetominophen or ibuprofen.   If your legs were swollen during your pregnancy or as a result of IV fluids given during your hospital stay, this should go away in a few days to a week. Most women experience some form of the \"Baby Blues\" after having a baby. This means that you may feel emotional, tearful, frustrated, anxious, sad, and irritable some of the time. This is normal if it's not severe and should go away after about 2 weeks. Getting as much rest as you can will help. Accept assistance from friends and family members so that you can take breaks from caring for the baby. Call your doctor if your symptoms seem severe, last more than 2 weeks, or seem to be getting worse instead of better. Get help immediately if you have thoughts of wanting to hurt yourself or others! When should you call for help? Call 911 anytime you think you may need emergency care. For example, call if:  You pass out (lose consciousness). You have sudden chest pain and shortness of breath, or you cough up blood. You have severe pain in your belly. Call your doctor now or seek immediate medical care if:  You have heavy vaginal bleeding that soaks one or more pads in an hour, or you have large clots (golf ball size or larger). Your have foul-smelling discharge from your vagina. You are sick to your stomach or cannot keep fluids down. You have pain that does not get better after you take pain medicine. You have signs of infection, such as: Increased pain in your abdomen or vaginal area  Red streaks, warmth, or tenderness of your breasts. A fever of 101 or greater (taken by mouth). You have signs of a blood clot, such as:  Pain in your calf, back of knee, thigh, or groin. Redness and swelling in your leg or groin. You have trouble passing urine or stool, especially if you have pain or swelling in your lower belly; or if you are unable to have a bowel movement after taking a laxative. You have a fast or pounding heartbeat.

## 2022-04-07 NOTE — LACTATION NOTE
This note was copied from a baby's chart. Mom says baby has been pulling off nipple and falling asleep at breast. Taking formula well. Discussed effects of early supplementation on breastfeeding success; may decrease breastmilk production and supply, increase risk for pathological engorgement, baby may develop preference for faster flow from bottles vs breast, and baby's stomach can be stretched if larger volumes of formula are given. Mom shown how to manually express milk into spoon and both spoon and syringe feed baby. Approx 5 drops of breastmiilk fed now. Shield brought to bedside by request and parents shown how to use. Baby able to latch deeply in prone position with sheild. A few suckles, swallows. Colostrum noted in shield when removed. Counseled that shield is a temporary tool and instructions provided for use. Dyad for discharge today. REviewed with mom that if she is providing formula and skipping a feed at the breast, she should be stimulating breasts,  to protect supply. Chart shows numerous feedings, void, stool WNL. Discussed importance of monitoring outputs and feedings on first week of life. Discussed ways to tell if baby is  getting enough breast milk, ie  voids and stools, change in color of stool, and return to birth wt within 2 weeks. Follow up with pediatrician visit for weight check in 1-2 days (per AAP guidelines.)  Encouraged to call Warm Line  [phone]  for any questions/problems that arise. Mother also given breastfeeding support group dates and times for any future needs    Pt will successfully establish breastfeeding by feeding in response to early feeding cues   or wake every 3h, will obtain deep latch, and will keep log of feedings/output. Taught to BF at hunger cues and or q 2-3 hrs and to offer 10-20 drops of hand expressed colostrum at any non-feeds.       Breast Assessment  Left Breast: Medium  Left Nipple: Everted,Intact  Right Breast: Medium  Right Nipple: Everted,Intact  Breast- Feeding Assessment  Attends Breast-Feeding Classes: No  Breast-Feeding Experience: No  Breast Trauma/Surgery: No  Type/Quality: Good  Lactation Consultant Visits  Breast-Feedings: Good   Mother/Infant Observation  Mother Observation: Alignment,Breast comfortable,Close hold,Holds breast,Lets baby end feeding,Nipple round on release  Infant Observation: Audible swallows,Breast tissue moves,Feeding cues,Latches nipple and aereolae,Lips flanged, lower,Lips flanged, upper,Opens mouth  LATCH Documentation  Latch: Grasps breast, tongue down, lips flanged, rhythmic sucking  Audible Swallowing: A few with stimulation  Type of Nipple: Everted (after stimulation)  Comfort (Breast/Nipple): Soft/non-tender  Hold (Positioning): Full assist, teach one side, mother does other, staff holds  [facility] Score: 8

## 2022-04-07 NOTE — PROGRESS NOTES
PostPartum Note    Geovanni Garcia  [id number]  2000  19 y.o.    S:  Ms. Geovanni Garcia is a 19 y.o. Luciana Tim PPD #2 s/p  @ [id number] complicated by preeclampsia with severe features (HA). Doing well. She had a baby girl. Her lochia is like a period. She describes her pain as mild and is well controlled with PO medications. She is breast feeding and this is going well. Tolerating PO intake. S/p mag. No ha/visual changes. O:   Visit Vitals  BP (!) 140/81 (BP 1 Location: Left upper arm)   Pulse 80   Temp 98.8 °F (37.1 °C)   Resp 16   Ht 5' 8\" (1.727 m)   Wt 112.9 kg (249 lb)   SpO2 100%   Breastfeeding Unknown   BMI 37.86 kg/m²       Gen - No acute distress  Respirations - nonlabored   Abdomen - Fundus firm below the umbilicus   Ext - Warm, well perfused, nontender     Lab Results   Component Value Date/Time    WBC 7.4 2022 07:51 AM    HGB 9.8 (L) 2022 07:51 AM    HCT 30.1 (L) 2022 07:51 AM    PLATELET 788 (L)  07:51 AM    MCV 84.8 2022 07:51 AM    Hgb, External 12.2 2021 12:00 AM    Hct, External 34.5 2021 12:00 AM         A/P:  PPD #2 s/p   @ [id number] complicated by preeclampsia with severe features, s/p 24h pp mag. 1.  Preeclampsia with severe features -- BP well controlled. Hold paramaters for labetalol. IV magnesium given pp. 2.  Routine PP instructions/ care discussed  3. Blood type - Rh positive  4. Rubella immune  5. Circumcision n/a   6. Discharge today, continue BP meds   7. F/U 1-2 weeks for PP check.       Joyce Li MD  [facility]

## 2022-04-07 NOTE — PROGRESS NOTES
Pt off unit in stable condition via wheelchair with volunteers for discharge home per Dr. Jose Justin. Pt is aware to follow up in 1-2 weeks for a blood pressure check. Prescriptions electronically sent to pt's pharmacy by Dr. Jose Justin. Pt denies any HA, dizziness, N/V, or pain at this time. Infant in car seat and discharged with mother. Postpartum discharge teaching completed by this RN. Perineal supplies and electronic blood pressure cuff given to pt. Discharge papers signed by pt and RN.

## 2022-05-04 PROBLEM — R52 PAIN: Status: RESOLVED | Noted: 2022-04-04 | Resolved: 2022-05-04

## 2025-01-26 ENCOUNTER — OFFICE VISIT (OUTPATIENT)
Age: 25
End: 2025-01-26

## 2025-01-26 VITALS
TEMPERATURE: 98.2 F | RESPIRATION RATE: 14 BRPM | HEIGHT: 68 IN | DIASTOLIC BLOOD PRESSURE: 79 MMHG | SYSTOLIC BLOOD PRESSURE: 126 MMHG | HEART RATE: 89 BPM | BODY MASS INDEX: 31.83 KG/M2 | WEIGHT: 210 LBS | OXYGEN SATURATION: 98 %

## 2025-01-26 DIAGNOSIS — R68.89 FLU-LIKE SYMPTOMS: ICD-10-CM

## 2025-01-26 DIAGNOSIS — R05.1 ACUTE COUGH: ICD-10-CM

## 2025-01-26 DIAGNOSIS — J10.1 INFLUENZA A: Primary | ICD-10-CM

## 2025-01-26 DIAGNOSIS — R06.02 SHORTNESS OF BREATH: ICD-10-CM

## 2025-01-26 LAB
INFLUENZA A ANTIGEN, POC: POSITIVE
INFLUENZA B ANTIGEN, POC: NEGATIVE

## 2025-01-26 RX ORDER — ALBUTEROL SULFATE 90 UG/1
2 INHALANT RESPIRATORY (INHALATION) EVERY 6 HOURS PRN
Qty: 18 G | Refills: 0 | Status: SHIPPED | OUTPATIENT
Start: 2025-01-26

## 2025-01-26 RX ORDER — GUAIFENESIN 200 MG/10ML
200 LIQUID ORAL 3 TIMES DAILY PRN
Qty: 210 ML | Refills: 0 | Status: SHIPPED | OUTPATIENT
Start: 2025-01-26 | End: 2025-02-02

## 2025-01-26 RX ORDER — OSELTAMIVIR PHOSPHATE 75 MG/1
75 CAPSULE ORAL 2 TIMES DAILY
Qty: 10 CAPSULE | Refills: 0 | Status: SHIPPED | OUTPATIENT
Start: 2025-01-26 | End: 2025-01-31

## 2025-01-26 RX ORDER — PRENATAL 168/IRON/FOLIC/OMEGA3 27-800-235
CAPSULE ORAL
COMMUNITY

## 2025-01-28 DIAGNOSIS — J18.9 COMMUNITY ACQUIRED PNEUMONIA, UNSPECIFIED LATERALITY: Primary | ICD-10-CM

## 2025-01-28 RX ORDER — AMOXICILLIN 500 MG/1
1000 CAPSULE ORAL 2 TIMES DAILY
Qty: 20 CAPSULE | Refills: 0 | Status: SHIPPED | OUTPATIENT
Start: 2025-01-28 | End: 2025-02-02

## 2025-01-28 NOTE — PROGRESS NOTES
CXR reveals that there is bilateral peribronchial thickening and prominence of the interstitial markings. She is influenza A positive and most likely this is viral in nature. However, this patient is currently 7-8 weeks pregnant, works as a RN in the ER, and I cannot rule out possible CAP. Will give 1000mg Amoxicillin BID for 7 days. I will caution patient to take this if she is not improving from the flu. Patient is taking tamiflu 5 day course. Additionally, I am putting in a referral to pulmonary for interstitial lung disease evaluation.

## 2025-02-13 DIAGNOSIS — Z34.90 PREGNANCY, UNSPECIFIED GESTATIONAL AGE: Primary | ICD-10-CM

## 2025-02-17 ENCOUNTER — INITIAL PRENATAL (OUTPATIENT)
Age: 25
End: 2025-02-17

## 2025-02-17 VITALS
SYSTOLIC BLOOD PRESSURE: 122 MMHG | HEART RATE: 100 BPM | DIASTOLIC BLOOD PRESSURE: 77 MMHG | WEIGHT: 214.2 LBS | BODY MASS INDEX: 32.57 KG/M2

## 2025-02-17 DIAGNOSIS — Z34.80 SUPERVISION OF OTHER NORMAL PREGNANCY, ANTEPARTUM: Primary | ICD-10-CM

## 2025-02-17 DIAGNOSIS — Z34.90 PREGNANCY, UNSPECIFIED GESTATIONAL AGE: Primary | ICD-10-CM

## 2025-02-17 DIAGNOSIS — Z34.90 PREGNANCY, UNSPECIFIED GESTATIONAL AGE: ICD-10-CM

## 2025-02-17 PROCEDURE — 0501F PRENATAL FLOW SHEET: CPT | Performed by: OBSTETRICS & GYNECOLOGY

## 2025-02-17 RX ORDER — PNV NO.95/FERROUS FUM/FOLIC AC 28MG-0.8MG
1 TABLET ORAL DAILY
Qty: 90 TABLET | Refills: 5 | Status: SHIPPED | OUTPATIENT
Start: 2025-02-17

## 2025-02-17 RX ORDER — PNV NO.95/FERROUS FUM/FOLIC AC 28MG-0.8MG
1 TABLET ORAL DAILY
Qty: 90 TABLET | Refills: 5 | Status: SHIPPED | OUTPATIENT
Start: 2025-02-17 | End: 2025-02-17

## 2025-02-17 RX ORDER — PYRIDOXINE HCL (VITAMIN B6) 100 MG
TABLET ORAL
COMMUNITY

## 2025-02-17 RX ORDER — PYRIDOXINE HCL (VITAMIN B6) 25 MG
TABLET ORAL
COMMUNITY
End: 2025-02-17

## 2025-02-17 RX ORDER — ONDANSETRON 8 MG/1
TABLET, ORALLY DISINTEGRATING ORAL
COMMUNITY
Start: 2025-02-03

## 2025-02-17 NOTE — PROGRESS NOTES
Laura Gipson is a 24 y.o. female presents for a new pregnancy visit.    Chief Complaint   Patient presents with    Initial Prenatal Visit     Problems: Amenorrhea     Patient's last menstrual period was 2024.    Last Pap: 5 years ago  LMP history:  The date of her LMP is  certain.  Her last menstrual period was normal and lasted for 4 to 5 days.  A urine pregnancy test was positive 5 weeks weeks ago. She was not on the pill at conception.     Based on her LMP, her EDC is 25 and her EGA is 10 weeks,2 days. Her menstrual cycles are regular and occur approximately every 28 days and range from 3 to 5 days. The last menses lasted  the usual number of days.      Ultrasound data:  She had an ultrasound done by the ultrasound tech today which revealed a viable montes pregnancy with a gestational age of 10 weeks and 0 days giving an EDC of 9/15/25.    Pregnancy symptoms:    Since her LMP she has experienced urinary frequency, breast tenderness, and nausea.   She has been vomiting over the last few weeks.  Associated signs and symptoms which she denies: dysuria, discharge, vaginal bleeding.    She states she has gained weight:  Approximately 5 pounds over the last few weeks.    Relevant past pregnancy history:   She has the following pregnancy history:     She has no history of  delivery.    Relevant past medical history:(relevant to this pregnancy): noncontributory.      Her occupation is: Nurse, ER.     1. Have you been to the ER, urgent care clinic, or hospitalized since your last visit? No    2. Have you seen or consulted any other health care providers outside of the [facility] since your last visit? No    Examination chaperoned by Marguerite Torres LPN.  
negative for chest pain, palpitations, edema  Resp: negative for cough, shortness of breath, wheezing  Breast: negative for breast lumps, nipple discharge, galactorrhea  GI: negative for change in bowel habits, abdominal pain, black or bloody stools  : negative for frequency, dysuria, hematuria, vaginal discharge  MSK: negative for back pain, joint pain, muscle pain  Skin: negative for itching, rash, hives  Neuro: negative for dizziness, headache, confusion, weakness  Psych: negative for anxiety, depression, change in mood  Heme/lymph: negative for bleeding, bruising, pallor    Objective:  /77   Pulse 100   Wt 97.2 kg (214 lb 3.2 oz)   LMP 12/07/2024   BMI 32.57 kg/m²     Physical Exam:     Constitutional  Appearance: well-nourished, well developed, alert, in no acute distress    HENT  Head  Face: appears normal  Eyes: appear normal  Ears: normal  Mouth: normal  Lips: no lesions    Neck  Inspection/Palpation: normal appearance, no masses or tenderness  Lymph Nodes: no lymphadenopathy present  Thyroid: gland size normal, nontender, no nodules or masses present on palpation    Chest  Respiratory Effort: breathing unlabored  Auscultation: normal breath sounds    Cardiovascular  Heart:  Auscultation: regular rate and rhythm without murmur    Breasts  Inspection of Breasts: breasts symmetrical, no skin changes, no discharge present, nipple appearance normal, no skin retraction present  Palpation of Breasts and Axillae: no masses present on palpation, no breast tenderness  Axillary Lymph Nodes: no lymphadenopathy present    Gastrointestinal  Abdominal Examination: abdomen non-tender to palpation, normal bowel sounds, no masses present  Liver and spleen: no hepatomegaly present, spleen not palpable  Hernias: no hernias identified    Genitourinary  External Genitalia: normal appearance for age, no discharge present, no tenderness present, no inflammatory lesions present, no masses present, no atrophy

## 2025-02-19 LAB — BACTERIA UR CULT: NO GROWTH

## 2025-02-24 LAB
., LABCORP: NORMAL
CYTOLOGIST CVX/VAG CYTO: NORMAL
CYTOLOGY CVX/VAG DOC CYTO: NORMAL
CYTOLOGY CVX/VAG DOC THIN PREP: NORMAL
DX ICD CODE: NORMAL
OTHER STN SPEC: NORMAL
SERVICE CMNT-IMP: NORMAL
STAT OF ADQ CVX/VAG CYTO-IMP: NORMAL

## 2025-02-25 LAB
EGFR GENE MUT TESTED BLD/T: NEGATIVE
KRAS GENE MUT ANL BLD/T: NEGATIVE
Lab: NEGATIVE
Lab: NORMAL
MICROARRAY PLATFORM: NEGATIVE
NTRA ALPHA-THALASSEMIA: NEGATIVE
NTRA BATTEN DISEASE (NEURONAL CEROID LIPOFUSCINOSIS, CLN3-RELATED): NEGATIVE
NTRA BETA-HEMOGLOBINOPATHIES: NEGATIVE
NTRA BLOOM SYNDROME: NEGATIVE
NTRA CANAVAN DISEASE: NEGATIVE
NTRA CITRULLINEMIA, TYPE I: NEGATIVE
NTRA CYSTIC FIBROSIS: NEGATIVE
NTRA DUCHENNE/BECKER MUSCULAR DYSTROPHY: NEGATIVE
NTRA FAMILIAL DYSAUTONOMIA: NEGATIVE
NTRA FANCONI ANEMIA, GROUP C: NEGATIVE
NTRA FRAGILE X SYNDROME: NEGATIVE
NTRA GALACTOSEMIA: NEGATIVE
NTRA GAUCHER DISEASE: NEGATIVE
NTRA GLYCOGEN STORAGE DISEASE, TYPE 1A: NEGATIVE
NTRA ISOVALERIC ACIDEMIA: NEGATIVE
NTRA MEDIUM CHAIN ACYL-COA DEHYDROGENASE DEFICIENCY: NEGATIVE
NTRA METHYLMALONIC ACIDURIA AND HOMOCYSTINURIA, TYPE CBLC: NEGATIVE
NTRA MUCOLIPIDOSIS, TYPE IV: NEGATIVE
NTRA POLYCYSTIC KIDNEY DISEASE, AUTOSOMAL RECESSIVE: NEGATIVE
NTRA SMITH-LEMLI-OPITZ SYNDROME: NEGATIVE
NTRA SPINAL MUSCULAR ATROPHY: NEGATIVE
NTRA TAY-SACHS DISEASE: NEGATIVE
NTRA TYROSINEMIA, TYPE I: NEGATIVE
NTRA ZELLWEGER SPECTRUM DISORDERS, PEX1-RELATED: NEGATIVE

## 2025-02-26 ENCOUNTER — TELEPHONE (OUTPATIENT)
Age: 25
End: 2025-02-26

## 2025-02-26 NOTE — TELEPHONE ENCOUNTER
PT name and  verified    25 yo last ov 25, next ov 3/4/25  , 11w4d    PT calling stating she has a yeast infection, she had vaginal itching and burning. PT reports she didn't call she just got [place] 3 days and started last night, typically takes Diflucan and knows she can't during pregnancy. PT reports last night she noted a spot of light pink dc/blood when she wiped and was concerned, therefore calling today to see what  thought.   PT states she has had yeast infections before, never has spotted with her other pregnancy, but has had heavy dc with this pregnancy. PT denies burning with urination, vag odor and abd cramping.  RN states she will consult  to see what she advises, placed PT on hold.  RN consulted  and states it may be from irritation or applicator insertion, that she can do her dose tonight of the Monistat and be seen in the am if the PT would like.  RN relayed MD's thought and offered am appt.  PT declines and states that she will continue to watch it, and wait, do her Monistat tonight and she can listen with a doppler tomorrow if she needs to. PT states if she thinks it is worst or her bleeding increases she will call back.

## 2025-02-27 ENCOUNTER — ROUTINE PRENATAL (OUTPATIENT)
Age: 25
End: 2025-02-27

## 2025-02-27 VITALS
DIASTOLIC BLOOD PRESSURE: 80 MMHG | WEIGHT: 218 LBS | BODY MASS INDEX: 33.15 KG/M2 | SYSTOLIC BLOOD PRESSURE: 127 MMHG | HEART RATE: 108 BPM

## 2025-02-27 DIAGNOSIS — Z34.80 SUPERVISION OF OTHER NORMAL PREGNANCY, ANTEPARTUM: Primary | ICD-10-CM

## 2025-02-27 PROCEDURE — 0502F SUBSEQUENT PRENATAL CARE: CPT | Performed by: OBSTETRICS & GYNECOLOGY

## 2025-02-27 NOTE — PROGRESS NOTES
Using monistat for probable yeast infection - saw some pink spotting  Pos FHT  No blood in vagina  Can use hydrocortisone externally

## 2025-02-27 NOTE — PROGRESS NOTES
EDC 9/13/25 D=US  Materniti 21 - normal female  Horizon normal  Baseline comp met in media - normal  Baseline PCR 0.06 in media  Preeclampsia G1  Baby ASA  Hx of tachycardia with G1 - required labetalol

## 2025-02-27 NOTE — TELEPHONE ENCOUNTER
PT calling back, name and  verified    PT calling stating she is at work, she monitored  last night and has increased bleeding, more frequent, red in color, every time she wipes now vs the one time she wiped  when it was pink.  PT still denies abdominal cramping, states she is working at the [place] [facility] today and was seeing if she could be seen.  RN asked PT to hold while she consulted , per availability MD states PT can be seen at 3 pm today, relayed her new sx, wants her to monitor until appt time.  RN relayed ov time and advice per . PT states she wants to be put on for that time and will try to get someone to cover her.

## 2025-03-03 NOTE — PROGRESS NOTES
Patient Active Problem List    Diagnosis Date Noted    Supervision of other normal pregnancy, antepartum 02/17/2025     Overview Note:     EDC 9/13/25 D=US  Materniti 21 - normal female  Horizon normal  Baseline comp met in media - normal  Baseline PCR 0.06 in media  Preeclampsia G1  Baby ASA  Hx of tachycardia with G1 - required labetalol         Tachycardia 03/15/2022

## 2025-03-04 ENCOUNTER — ROUTINE PRENATAL (OUTPATIENT)
Age: 25
End: 2025-03-04

## 2025-03-04 VITALS
DIASTOLIC BLOOD PRESSURE: 79 MMHG | WEIGHT: 215 LBS | HEART RATE: 99 BPM | BODY MASS INDEX: 32.69 KG/M2 | SYSTOLIC BLOOD PRESSURE: 119 MMHG

## 2025-03-04 DIAGNOSIS — Z34.80 SUPERVISION OF OTHER NORMAL PREGNANCY, ANTEPARTUM: Primary | ICD-10-CM

## 2025-03-04 PROCEDURE — 0502F SUBSEQUENT PRENATAL CARE: CPT | Performed by: OBSTETRICS & GYNECOLOGY

## 2025-03-04 ASSESSMENT — PATIENT HEALTH QUESTIONNAIRE - PHQ9
SUM OF ALL RESPONSES TO PHQ QUESTIONS 1-9: 0
2. FEELING DOWN, DEPRESSED OR HOPELESS: NOT AT ALL
1. LITTLE INTEREST OR PLEASURE IN DOING THINGS: NOT AT ALL
SUM OF ALL RESPONSES TO PHQ QUESTIONS 1-9: 0

## 2025-04-08 ENCOUNTER — ROUTINE PRENATAL (OUTPATIENT)
Age: 25
End: 2025-04-08

## 2025-04-08 VITALS — DIASTOLIC BLOOD PRESSURE: 74 MMHG | BODY MASS INDEX: 35.43 KG/M2 | SYSTOLIC BLOOD PRESSURE: 109 MMHG | WEIGHT: 233 LBS

## 2025-04-08 DIAGNOSIS — O99.210 OBESITY AFFECTING PREGNANCY, ANTEPARTUM, UNSPECIFIED OBESITY TYPE: ICD-10-CM

## 2025-04-08 DIAGNOSIS — Z3A.17 17 WEEKS GESTATION OF PREGNANCY: ICD-10-CM

## 2025-04-08 DIAGNOSIS — Z34.80 SUPERVISION OF OTHER NORMAL PREGNANCY, ANTEPARTUM: Primary | ICD-10-CM

## 2025-04-08 PROCEDURE — 0502F SUBSEQUENT PRENATAL CARE: CPT | Performed by: OBSTETRICS & GYNECOLOGY

## 2025-04-11 LAB
AFP INTERP SERPL-IMP: NORMAL
AFP MOM SERPL: 1.38
AFP SERPL-MCNC: 40.2 NG/ML
AGE AT DELIVERY: 27.3 YR
AGE OF EGG DONOR: 1
AGE OF EGG DONOR: 1
COMMENT: NORMAL
DONOR EGG?: NO
DONOR EGG?: NO
FAMILY HISTORY NTD: 0
FHX NTD (Y OR N): 1
GA METHOD: NORMAL
GA: 17 WEEKS
IDDM PATIENT QL: NO
INSULIN DEP. DIABETIC: 1
Lab: 0
Lab: 233
Lab: NORMAL
MAT SCN FOR FETAL ABNORMALITIES SERPL: NORMAL
MULTIPLE PREGNANCY: NO
NEURAL TUBE DEFECT RISK FETUS: 3810
NUMBER OF FETUSES: NO
OTHER INDICATIONS: NO
OTHER INDICATIONS: NO
PREVIOUSLY ELEVATED AFP (Y OR N): 17
PREVIOUSLY ELEVATED AFP (Y OR N): NO
PRIOR 1ST TRIM TESTING ?: NO
PRIOR 2ND TRIM TESTING ?: NO
PRIOR DS/NTD SCREEN CURRENT PREGNANCY?: NO
PRIOR DS/NTD SCREEN CURRENT PREGNANCY?: NO
PRIOR FIRST TRIMESTER TESTING (Y OR N ): 0
PRIOR PREGNANCY WITH DOWN SYNDROME (Y OR N): 1
PRIOR PREGNANCY WITH DOWN SYNDROME (Y OR N): NO
TYPE OF EGG DONOR: 1
TYPE OF EGG DONOR: NORMAL

## 2025-04-13 ENCOUNTER — RESULTS FOLLOW-UP (OUTPATIENT)
Age: 25
End: 2025-04-13

## 2025-04-24 DIAGNOSIS — Z34.90 PREGNANCY, UNSPECIFIED GESTATIONAL AGE: Primary | ICD-10-CM

## 2025-04-29 ENCOUNTER — ROUTINE PRENATAL (OUTPATIENT)
Age: 25
End: 2025-04-29
Payer: COMMERCIAL

## 2025-04-29 VITALS — SYSTOLIC BLOOD PRESSURE: 128 MMHG | HEART RATE: 99 BPM | DIASTOLIC BLOOD PRESSURE: 76 MMHG

## 2025-04-29 DIAGNOSIS — O26.90 PREGNANCY COMPLICATION, ANTEPARTUM: Primary | ICD-10-CM

## 2025-04-29 DIAGNOSIS — O99.210 OBESITY AFFECTING PREGNANCY, ANTEPARTUM, UNSPECIFIED OBESITY TYPE: ICD-10-CM

## 2025-04-29 DIAGNOSIS — Z34.90 PREGNANCY, UNSPECIFIED GESTATIONAL AGE: ICD-10-CM

## 2025-04-29 PROCEDURE — 76811 OB US DETAILED SNGL FETUS: CPT | Performed by: STUDENT IN AN ORGANIZED HEALTH CARE EDUCATION/TRAINING PROGRAM

## 2025-04-29 PROCEDURE — 99204 OFFICE O/P NEW MOD 45 MIN: CPT | Performed by: STUDENT IN AN ORGANIZED HEALTH CARE EDUCATION/TRAINING PROGRAM

## 2025-04-29 NOTE — PROGRESS NOTES
Patient was seen 4/29/2025      Please look under media to view full consult and ultrasound report in ViewPoint.         Maddie Villareal MD   Maternal Fetal Medicine

## 2025-04-29 NOTE — PROCEDURES
PATIENT: RANDI GOODE   -  : 2000   -  DOS:2025   -  INTERPRETING PROVIDER:Maddie Villareal,   Indication  ========    BMI 32    Method  ======    Transabdominal ultrasound examination. View: suboptimal due to maternal acoustic properties and unfavorable fetal position    Dating  ======    LMP on: 2024  Cycle: regular cycle  GA by LMP 20 w + 3 d  KALEB by LMP: 2025  Previous Ultrasound on: 2025  Type of prior assessment: GA  GA at prior assessment date 10 w + 0 d  GA by previous U/S 20 w + 1 d  KALEB by previous Ultrasound: 9/15/2025  Ultrasound examination on: 2025  GA by U/S based upon: AC, BPD, Femur, HC  GA by U/S 20 w + 2 d  KALEB by U/S: 2025  Assigned: based on the LMP, selected on 2025  Assigned GA 20 w + 3 d  Assigned KALEB: 2025    Fetal Growth Overview  =================    Exam date        GA              BPD (mm)          HC (mm)              AC (mm)               FL (mm)             HL (mm)            EFW (g)  2025        20w 3d        46.8     37%        175.2    24%        154.1     50%        32.9    36%        32.1     63%        351    43%    Fetal Biometry  ============    Standard  BPD 46.8 mm 20w 1d 37% Hadlock  OFD 62.9 mm 21w 3d 84% Ricardo  .2 mm 20w 0d 24% Hadlock  Cerebellum tr 19.9 mm 19w 1d 26% Hill  Nuchal fold 3.5 mm  .1 mm 20w 4d 50% Hadlock  Femur 32.9 mm 20w 2d 36% Hadlock  Humerus 32.1 mm 20w 5d 63% Ricardo   g 20w 2d 43% Hadlock  EFW (lb) 0 lb  EFW (oz) 12 oz  EFW by: Hadlock (BPD-HC-AC-FL)  Extended   5.6 mm  CM 6.0 mm  76% Nicolaides  Nasal bone 6.3 mm  Head / Face / Neck  Nasal bone: present  Other Structures   bpm    General Evaluation  ==============    Cardiac activity present.  bpm. Fetal movements: visualized. Presentation: BREECH  Placenta: Placental site: anterior, appropriate distance from the internal os. Placental edge-to-cervical os distance 2.1 cm  Umbilical cord: Cord vessels: 3 vessel

## 2025-04-30 SDOH — ECONOMIC STABILITY: INCOME INSECURITY: IN THE LAST 12 MONTHS, WAS THERE A TIME WHEN YOU WERE NOT ABLE TO PAY THE MORTGAGE OR RENT ON TIME?: NO

## 2025-04-30 SDOH — ECONOMIC STABILITY: FOOD INSECURITY: WITHIN THE PAST 12 MONTHS, YOU WORRIED THAT YOUR FOOD WOULD RUN OUT BEFORE YOU GOT MONEY TO BUY MORE.: NEVER TRUE

## 2025-04-30 SDOH — ECONOMIC STABILITY: TRANSPORTATION INSECURITY
IN THE PAST 12 MONTHS, HAS THE LACK OF TRANSPORTATION KEPT YOU FROM MEDICAL APPOINTMENTS OR FROM GETTING MEDICATIONS?: NO

## 2025-04-30 SDOH — ECONOMIC STABILITY: FOOD INSECURITY: WITHIN THE PAST 12 MONTHS, THE FOOD YOU BOUGHT JUST DIDN'T LAST AND YOU DIDN'T HAVE MONEY TO GET MORE.: NEVER TRUE

## 2025-04-30 SDOH — ECONOMIC STABILITY: TRANSPORTATION INSECURITY
IN THE PAST 12 MONTHS, HAS LACK OF TRANSPORTATION KEPT YOU FROM MEETINGS, WORK, OR FROM GETTING THINGS NEEDED FOR DAILY LIVING?: NO

## 2025-05-01 ENCOUNTER — ROUTINE PRENATAL (OUTPATIENT)
Age: 25
End: 2025-05-01

## 2025-05-01 VITALS
SYSTOLIC BLOOD PRESSURE: 120 MMHG | HEART RATE: 100 BPM | WEIGHT: 236.6 LBS | DIASTOLIC BLOOD PRESSURE: 73 MMHG | BODY MASS INDEX: 35.97 KG/M2

## 2025-05-01 DIAGNOSIS — Z3A.20 20 WEEKS GESTATION OF PREGNANCY: ICD-10-CM

## 2025-05-01 DIAGNOSIS — O99.210 OBESITY AFFECTING PREGNANCY, ANTEPARTUM, UNSPECIFIED OBESITY TYPE: ICD-10-CM

## 2025-05-01 DIAGNOSIS — Z34.80 SUPERVISION OF OTHER NORMAL PREGNANCY, ANTEPARTUM: Primary | ICD-10-CM

## 2025-05-01 PROCEDURE — 0502F SUBSEQUENT PRENATAL CARE: CPT | Performed by: OBSTETRICS & GYNECOLOGY

## 2025-05-01 NOTE — PROGRESS NOTES
Patient Active Problem List    Diagnosis Date Noted    Supervision of other normal pregnancy, antepartum 02/17/2025     Overview Note:     EDC 9/13/25 D=US  Materniti 21 - normal female  Horizon normal  Baseline comp met in media - normal  Baseline PCR 0.06 in media  Preeclampsia G1  Baby ASA  Hx of tachycardia with G1 - required labetalol  zoloft         Tachycardia 03/15/2022

## 2025-05-29 ENCOUNTER — ROUTINE PRENATAL (OUTPATIENT)
Age: 25
End: 2025-05-29

## 2025-05-29 VITALS — WEIGHT: 245.2 LBS | DIASTOLIC BLOOD PRESSURE: 77 MMHG | BODY MASS INDEX: 37.28 KG/M2 | SYSTOLIC BLOOD PRESSURE: 122 MMHG

## 2025-05-29 DIAGNOSIS — Z34.80 SUPERVISION OF OTHER NORMAL PREGNANCY, ANTEPARTUM: Primary | ICD-10-CM

## 2025-05-29 PROCEDURE — 0502F SUBSEQUENT PRENATAL CARE: CPT | Performed by: OBSTETRICS & GYNECOLOGY

## 2025-06-02 ENCOUNTER — ROUTINE PRENATAL (OUTPATIENT)
Age: 25
End: 2025-06-02
Payer: COMMERCIAL

## 2025-06-02 VITALS — HEART RATE: 106 BPM | SYSTOLIC BLOOD PRESSURE: 107 MMHG | DIASTOLIC BLOOD PRESSURE: 70 MMHG

## 2025-06-02 DIAGNOSIS — Z34.90 PREGNANCY, UNSPECIFIED GESTATIONAL AGE: ICD-10-CM

## 2025-06-02 PROCEDURE — 76816 OB US FOLLOW-UP PER FETUS: CPT | Performed by: STUDENT IN AN ORGANIZED HEALTH CARE EDUCATION/TRAINING PROGRAM

## 2025-06-02 PROCEDURE — 99214 OFFICE O/P EST MOD 30 MIN: CPT | Performed by: STUDENT IN AN ORGANIZED HEALTH CARE EDUCATION/TRAINING PROGRAM

## 2025-06-02 NOTE — PROCEDURES
PATIENT: RANDI GOODE   -  : 2000   -  DOS:2025   -  INTERPRETING PROVIDER:Maddie Villareal,   Indication  ========    BMI 32    Method  ======    Transabdominal ultrasound examination. View: Good view    Pregnancy  =========    Duggan pregnancy. Number of fetuses: 1    Dating  ======    LMP on: 2024  Cycle: regular cycle  GA by LMP 25 w + 2 d  KALEB by LMP: 2025  Previous Ultrasound on: 2025  Type of prior assessment: GA  GA at prior assessment date 10 w + 0 d  GA by previous U/S 25 w + 0 d  KALEB by previous Ultrasound: 9/15/2025  Ultrasound examination on: 2025  GA by U/S based upon: AC, BPD, Femur, HC  GA by U/S 24 w + 6 d  KALEB by U/S: 2025  Assigned: based on the LMP, selected on 2025  Assigned GA 25 w + 2 d  Assigned KALEB: 2025    Fetal Biometry  ============    Standard  BPD 60.2 mm 24w 4d 17% Hadlock  OFD 80.9 mm 26w 2d 81% Ricardo  .0 mm 24w 5d 13% Hadlock  .9 mm 24w 6d 27% Hadlock  Femur 46.1 mm 25w 2d 37% Hadlock   g 24w 5d 27% Hadlock  EFW (lb) 1 lb  EFW (oz) 11 oz  EFW by: Hadlock (BPD-HC-AC-FL)  Extended   5.5 mm  Other Structures   bpm    General Evaluation  ==============    Cardiac activity present.  bpm. Fetal movements: visualized. Presentation: Cephalic  Placenta: Placental site: anterior, appropriate distance from the internal os  Umbilical cord: Cord vessels: 3 vessel cord. Insertion site: central  Amniotic fluid: Amount of AF: normal. MVP 5.0 cm. PILLO 15.2 cm. Q1 5.0 cm, Q2 4.5 cm, Q3 3.0 cm, Q4 2.7 cm    Fetal Anatomy  ===========    Lips: normal  Nose: normal  RVOT view: normal  Heart / Thorax  Ductal arch view: normal  Stomach: normal  Kidneys: normal  Bladder: normal  Lumbar spine: normal  Wants to know fetal sex: yes    Findings  =======    Intrauterine Duggan pregnancy at 25w 2d by clinical dates.  EFW is 755 g at 27%, abdominal circumference at 27%.  Amniotic fluid: normal.  Placenta is anterior,

## 2025-06-02 NOTE — PROGRESS NOTES
Patient was seen 6/2/2025      Please look under media to view full consult and ultrasound report in ViewPoint.         Maddie Villareal MD   Maternal Fetal Medicine

## 2025-06-27 NOTE — PATIENT INSTRUCTIONS
Patient Education        Weeks 26 to 30 of Your Pregnancy: Care Instructions  You're starting your last trimester. You'll probably feel your baby moving around more. Your back may ache as your body gets used to your baby's size and length. Take care of yourself, and pay attention to what your body needs.     Talk to your doctor about getting the Tdap shot. It will help protect your  against whooping cough (pertussis). Also ask your doctor about flu and COVID-19 shots if you haven't had them yet. If your blood type is Rh negative, you may be given a shot of Rh immune globulin (such as RhoGAM). It can help prevent problems for your baby.        You may have Gigi-Garcia contractions. They are single or several strong contractions without a pattern. These are practice contractions but not the start of labor.     4 ways to take care of yourself during weeks 26 to 30 of pregnancy       Be kind to yourself.  Take breaks when you're tired.  Change positions often. Don't sit for too long or stand for too long.  At work, rest during breaks if you can. If you don't get breaks, talk to your doctor about writing a letter to your employer to request them.  Avoid fumes, chemicals, and tobacco smoke.         Be sexual if you want to.  You may be interested in sex, or you may not. Everyone is different.  Sex is okay unless your doctor tells you not to.  Your belly can make it hard to find good positions for sex. [place] and explore.         Watch for signs of  labor.  These signs include:  Menstrual-like cramps. Or you may have pain or pressure in your pelvis that happens in a pattern.  About 6 or more contractions in an hour (even after rest and a glass of water).  A low, dull backache that doesn't go away when you change positions.  An increase or change in vaginal discharge.  Light vaginal bleeding or spotting.  Your water breaking.       Know what to do if you think you are having contractions.  Drink 1 or 2

## 2025-07-01 ENCOUNTER — RESULTS FOLLOW-UP (OUTPATIENT)
Age: 25
End: 2025-07-01

## 2025-07-01 ENCOUNTER — ROUTINE PRENATAL (OUTPATIENT)
Age: 25
End: 2025-07-01
Payer: COMMERCIAL

## 2025-07-01 VITALS — WEIGHT: 251.8 LBS | BODY MASS INDEX: 38.29 KG/M2 | SYSTOLIC BLOOD PRESSURE: 103 MMHG | DIASTOLIC BLOOD PRESSURE: 70 MMHG

## 2025-07-01 DIAGNOSIS — Z36.9 ENCOUNTER FOR ANTENATAL SCREENING OF MOTHER: ICD-10-CM

## 2025-07-01 DIAGNOSIS — Z3A.29 29 WEEKS GESTATION OF PREGNANCY: ICD-10-CM

## 2025-07-01 DIAGNOSIS — Z23 NEED FOR VACCINATION: ICD-10-CM

## 2025-07-01 DIAGNOSIS — O99.210 OBESITY AFFECTING PREGNANCY, ANTEPARTUM, UNSPECIFIED OBESITY TYPE: ICD-10-CM

## 2025-07-01 DIAGNOSIS — Z34.80 SUPERVISION OF OTHER NORMAL PREGNANCY, ANTEPARTUM: Primary | ICD-10-CM

## 2025-07-01 LAB
ABO + RH BLD: NORMAL
BLOOD BANK CMNT PATIENT-IMP: NORMAL
BLOOD GROUP ANTIBODIES SERPL: NORMAL
ERYTHROCYTE [DISTWIDTH] IN BLOOD BY AUTOMATED COUNT: 13.8 % (ref 11.5–14.5)
HBV SURFACE AG SER QL: 0.11 INDEX
HBV SURFACE AG SER QL: NEGATIVE
HCT VFR BLD AUTO: 35.2 % (ref 35–47)
HCV AB SER IA-ACNC: 0.07 INDEX
HCV AB SERPL QL IA: NONREACTIVE
HGB BLD-MCNC: 11.8 G/DL (ref 11.5–16)
HIV 1+2 AB+HIV1 P24 AG SERPL QL IA: NONREACTIVE
HIV 1/2 RESULT COMMENT: NORMAL
MCH RBC QN AUTO: 31.2 PG (ref 26–34)
MCHC RBC AUTO-ENTMCNC: 33.5 G/DL (ref 30–36.5)
MCV RBC AUTO: 93.1 FL (ref 80–99)
NRBC # BLD: 0 K/UL (ref 0–0.01)
NRBC BLD-RTO: 0 PER 100 WBC
PLATELET # BLD AUTO: 138 K/UL (ref 150–400)
PMV BLD AUTO: 11 FL (ref 8.9–12.9)
RBC # BLD AUTO: 3.78 M/UL (ref 3.8–5.2)
RUBV IGG SERPL IA-ACNC: NORMAL IU/ML
SPECIMEN EXP DATE BLD: NORMAL
WBC # BLD AUTO: 7.7 K/UL (ref 3.6–11)

## 2025-07-01 PROCEDURE — 90471 IMMUNIZATION ADMIN: CPT | Performed by: OBSTETRICS & GYNECOLOGY

## 2025-07-01 PROCEDURE — 90715 TDAP VACCINE 7 YRS/> IM: CPT | Performed by: OBSTETRICS & GYNECOLOGY

## 2025-07-01 PROCEDURE — 0502F SUBSEQUENT PRENATAL CARE: CPT | Performed by: OBSTETRICS & GYNECOLOGY

## 2025-07-01 NOTE — PROGRESS NOTES
/After obtaining consent, and per orders of Dr. Garcia, injection of TDAP given in Left Deltoid by Bhavin Brothers MA. Patient instructed to remain in clinic for 20 minutes afterwards, and to report any adverse reaction to me immediately. Lot: [id number] Exp: 08/29/2027 NDC: 23705-212-94 , VIS given.

## 2025-07-01 NOTE — PROGRESS NOTES
Baby moving  Glucola and tdap today  Rh pos  Looks like prenatal labs never done - will draw today

## 2025-07-02 LAB
ERYTHROCYTE [DISTWIDTH] IN BLOOD BY AUTOMATED COUNT: 13 % (ref 11.7–15.4)
HCT VFR BLD AUTO: 36.5 % (ref 34–46.6)
HGB BLD-MCNC: 12 G/DL (ref 11.1–15.9)
MCH RBC QN AUTO: 31.8 PG (ref 26.6–33)
MCHC RBC AUTO-ENTMCNC: 32.9 G/DL (ref 31.5–35.7)
MCV RBC AUTO: 97 FL (ref 79–97)
PLATELET # BLD AUTO: 146 X10E3/UL (ref 150–450)
RBC # BLD AUTO: 3.77 X10E6/UL (ref 3.77–5.28)
WBC # BLD AUTO: 8.2 X10E3/UL (ref 3.4–10.8)

## 2025-07-03 ENCOUNTER — TELEPHONE (OUTPATIENT)
Age: 25
End: 2025-07-03

## 2025-07-03 LAB
GLUCOSE 1H P 50 G GLC PO SERPL-MCNC: 152 MG/DL (ref 70–139)
MEV IGG SER IA-ACNC: 64.4 AU/ML
T PALLIDUM AB SER QL IA: NON REACTIVE
VZV IGG SER IA-ACNC: REACTIVE

## 2025-07-03 NOTE — TELEPHONE ENCOUNTER
Pt name and  verified  Pt of   Pt calling about her most recent lab results for her glucose testing, nurse relays MD has yet to read them. Pt calling asking if she needs the 3 hour testing done, nurse ti call patient back on Monday after provider reads and results labs. Pt verbalized understanding.

## 2025-07-11 ENCOUNTER — LAB (OUTPATIENT)
Age: 25
End: 2025-07-11

## 2025-07-11 DIAGNOSIS — Z34.80 SUPERVISION OF OTHER NORMAL PREGNANCY, ANTEPARTUM: Primary | ICD-10-CM

## 2025-07-11 DIAGNOSIS — O99.210 OBESITY AFFECTING PREGNANCY, ANTEPARTUM, UNSPECIFIED OBESITY TYPE: ICD-10-CM

## 2025-07-13 LAB
GESTATIONAL 3HR GTT: NORMAL
GLUCOSE 1H P 100 G GLC PO SERPL-MCNC: NORMAL MG/DL (ref 65–180)
GLUCOSE 2 HOUR: 142 MG/DL (ref 65–155)
GLUCOSE P FAST SERPL-MCNC: 85 MG/DL (ref 65–95)
GLUCOSE, 3 HOUR: 114 MG/DL (ref 65–140)

## 2025-07-15 ENCOUNTER — ROUTINE PRENATAL (OUTPATIENT)
Age: 25
End: 2025-07-15

## 2025-07-15 ENCOUNTER — TELEPHONE (OUTPATIENT)
Age: 25
End: 2025-07-15

## 2025-07-15 VITALS — DIASTOLIC BLOOD PRESSURE: 78 MMHG | BODY MASS INDEX: 38.89 KG/M2 | WEIGHT: 255.8 LBS | SYSTOLIC BLOOD PRESSURE: 125 MMHG

## 2025-07-15 DIAGNOSIS — Z34.80 SUPERVISION OF OTHER NORMAL PREGNANCY, ANTEPARTUM: Primary | ICD-10-CM

## 2025-07-15 DIAGNOSIS — O99.210 OBESITY AFFECTING PREGNANCY, ANTEPARTUM, UNSPECIFIED OBESITY TYPE: ICD-10-CM

## 2025-07-15 DIAGNOSIS — Z3A.31 31 WEEKS GESTATION OF PREGNANCY: ICD-10-CM

## 2025-07-15 PROCEDURE — 0502F SUBSEQUENT PRENATAL CARE: CPT | Performed by: OBSTETRICS & GYNECOLOGY
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
## 2025-07-15 NOTE — PROGRESS NOTES
Patient Active Problem List    Diagnosis Date Noted    Supervision of other normal pregnancy, antepartum 02/17/2025     Overview Note:     EDC 9/13/25 D=US  Materniti 21 - normal female  Horizon normal  Baseline comp met in media - normal  Baseline PCR 0.06 in media  Preeclampsia G1  Baby ASA  Hx of tachycardia with G1 - required labetalol  Zoloft  Abnl glucola, nl 3hr GTT         Tachycardia 03/15/2022

## 2025-07-22 ENCOUNTER — ROUTINE PRENATAL (OUTPATIENT)
Age: 25
End: 2025-07-22
Payer: COMMERCIAL

## 2025-07-22 VITALS — HEART RATE: 94 BPM | DIASTOLIC BLOOD PRESSURE: 73 MMHG | SYSTOLIC BLOOD PRESSURE: 112 MMHG

## 2025-07-22 DIAGNOSIS — Z34.90 PREGNANCY, UNSPECIFIED GESTATIONAL AGE: ICD-10-CM

## 2025-07-22 PROCEDURE — 99213 OFFICE O/P EST LOW 20 MIN: CPT | Performed by: STUDENT IN AN ORGANIZED HEALTH CARE EDUCATION/TRAINING PROGRAM

## 2025-07-22 PROCEDURE — 76816 OB US FOLLOW-UP PER FETUS: CPT | Performed by: STUDENT IN AN ORGANIZED HEALTH CARE EDUCATION/TRAINING PROGRAM

## 2025-07-22 NOTE — PROCEDURES
PATIENT: RANDI GOODE   -  : 2000   -  DOS:2025   -  INTERPRETING PROVIDER:Lamar Abad,   Indication  ========    BMI 32    Method  ======    Transabdominal ultrasound examination. View: Sufficient    Pregnancy  =========    Duggan pregnancy. Number of fetuses: 1    Dating  ======    LMP on: 2024  Cycle: regular cycle  GA by LMP 32 w + 3 d  KALEB by LMP: 2025  Previous Ultrasound on: 2025  Type of prior assessment: GA  GA at prior assessment date 10 w + 0 d  GA by previous U/S 32 w + 1 d  KALEB by previous Ultrasound: 9/15/2025  Ultrasound examination on: 2025  GA by U/S based upon: AC, BPD, Femur, HC  GA by U/S 32 w + 5 d  KALEB by U/S: 2025  Assigned: based on the LMP, selected on 2025  Assigned GA 32 w + 3 d  Assigned KALEB: 2025    Fetal Biometry  ============    Standard  BPD 80.0 mm 32w 1d 33% Hadlock  .4 mm 38w 1d >99% Ricardo  .8 mm 34w 3d 68% Hadlock  .6 mm 32w 3d 48% Hadlock  Femur 61.4 mm 31w 6d 23% Hadlock  EFW 1,969 g 32w 1d 39% Hadlock  EFW (lb) 4 lb  EFW (oz) 5 oz  EFW by: Hadlock (BPD-HC-AC-FL)  Other Structures   bpm    General Evaluation  ==============    Cardiac activity present.  bpm. Fetal movements: visualized. Presentation: Cephalic  Placenta: Placental site: anterior, appropriate distance from the internal os  Umbilical cord: Cord vessels: 3 vessel cord. Insertion site: central  Amniotic fluid: Amount of AF: normal. MVP 5.1 cm. PILLO 14.8 cm. Q1 4.1 cm, Q2 5.1 cm, Q3 1.4 cm, Q4 4.2 cm    Fetal Anatomy  ===========    Stomach: normal  Kidneys: normal  Bladder: normal  Wants to know fetal sex: yes    Findings  =======    Intrauterine Duggan pregnancy at 32w 3d by clinical dates.  EFW is 1971 g at 39%, abdominal circumference at 48%.  Amniotic fluid: normal.  Placenta is anterior, appropriate distance from the internal os.  Cephalic presentation.    The ultrasound findings as listed above and diagnostic

## 2025-08-01 ENCOUNTER — ROUTINE PRENATAL (OUTPATIENT)
Age: 25
End: 2025-08-01

## 2025-08-01 VITALS
HEART RATE: 68 BPM | SYSTOLIC BLOOD PRESSURE: 124 MMHG | DIASTOLIC BLOOD PRESSURE: 84 MMHG | BODY MASS INDEX: 39.11 KG/M2 | WEIGHT: 257.2 LBS

## 2025-08-01 DIAGNOSIS — Z3A.33 33 WEEKS GESTATION OF PREGNANCY: ICD-10-CM

## 2025-08-01 DIAGNOSIS — Z34.80 SUPERVISION OF OTHER NORMAL PREGNANCY, ANTEPARTUM: Primary | ICD-10-CM

## 2025-08-01 DIAGNOSIS — O99.210 OBESITY AFFECTING PREGNANCY, ANTEPARTUM, UNSPECIFIED OBESITY TYPE: ICD-10-CM

## 2025-08-13 ENCOUNTER — ROUTINE PRENATAL (OUTPATIENT)
Age: 25
End: 2025-08-13

## 2025-08-13 ENCOUNTER — HOSPITAL ENCOUNTER (OUTPATIENT)
Facility: HOSPITAL | Age: 25
Setting detail: OBSERVATION
Discharge: HOME | End: 2025-08-13
Attending: OBSTETRICS & GYNECOLOGY | Admitting: OBSTETRICS & GYNECOLOGY
Payer: COMMERCIAL

## 2025-08-13 VITALS — WEIGHT: 263.6 LBS | SYSTOLIC BLOOD PRESSURE: 136 MMHG | BODY MASS INDEX: 40.08 KG/M2 | DIASTOLIC BLOOD PRESSURE: 82 MMHG

## 2025-08-13 VITALS — SYSTOLIC BLOOD PRESSURE: 119 MMHG | HEART RATE: 93 BPM | DIASTOLIC BLOOD PRESSURE: 65 MMHG

## 2025-08-13 DIAGNOSIS — O36.8130 DECREASED FETAL MOVEMENTS IN THIRD TRIMESTER, SINGLE OR UNSPECIFIED FETUS: ICD-10-CM

## 2025-08-13 DIAGNOSIS — Z34.80 SUPERVISION OF OTHER NORMAL PREGNANCY, ANTEPARTUM: Primary | ICD-10-CM

## 2025-08-13 PROCEDURE — G0378 HOSPITAL OBSERVATION PER HR: HCPCS

## 2025-08-13 PROCEDURE — G0379 DIRECT REFER HOSPITAL OBSERV: HCPCS

## 2025-08-13 PROCEDURE — 94761 N-INVAS EAR/PLS OXIMETRY MLT: CPT

## 2025-08-13 PROCEDURE — 0502F SUBSEQUENT PRENATAL CARE: CPT | Performed by: OBSTETRICS & GYNECOLOGY

## 2025-08-13 PROCEDURE — 99202 OFFICE O/P NEW SF 15 MIN: CPT

## 2025-08-14 ENCOUNTER — ROUTINE PRENATAL (OUTPATIENT)
Age: 25
End: 2025-08-14

## 2025-08-14 ENCOUNTER — CARE COORDINATION (OUTPATIENT)
Dept: OTHER | Facility: CLINIC | Age: 25
End: 2025-08-14

## 2025-08-14 VITALS — DIASTOLIC BLOOD PRESSURE: 78 MMHG | WEIGHT: 263.9 LBS | SYSTOLIC BLOOD PRESSURE: 110 MMHG | BODY MASS INDEX: 40.13 KG/M2

## 2025-08-14 DIAGNOSIS — Z3A.35 35 WEEKS GESTATION OF PREGNANCY: ICD-10-CM

## 2025-08-14 DIAGNOSIS — O99.210 OBESITY AFFECTING PREGNANCY, ANTEPARTUM, UNSPECIFIED OBESITY TYPE: ICD-10-CM

## 2025-08-14 DIAGNOSIS — Z34.80 SUPERVISION OF OTHER NORMAL PREGNANCY, ANTEPARTUM: Primary | ICD-10-CM

## 2025-08-14 RX ORDER — ASPIRIN 81 MG/1
81 TABLET, CHEWABLE ORAL DAILY
COMMUNITY

## 2025-08-16 LAB — GP B STREP DNA SPEC QL NAA+PROBE: POSITIVE

## 2025-08-25 ENCOUNTER — ROUTINE PRENATAL (OUTPATIENT)
Age: 25
End: 2025-08-25

## 2025-08-25 VITALS — WEIGHT: 264.8 LBS | BODY MASS INDEX: 40.26 KG/M2 | DIASTOLIC BLOOD PRESSURE: 70 MMHG | SYSTOLIC BLOOD PRESSURE: 118 MMHG

## 2025-08-25 DIAGNOSIS — O99.210 OBESITY AFFECTING PREGNANCY, ANTEPARTUM, UNSPECIFIED OBESITY TYPE: ICD-10-CM

## 2025-08-25 DIAGNOSIS — Z3A.37 37 WEEKS GESTATION OF PREGNANCY: ICD-10-CM

## 2025-08-25 DIAGNOSIS — N89.8 VAGINAL DISCHARGE: ICD-10-CM

## 2025-08-25 DIAGNOSIS — Z34.80 SUPERVISION OF OTHER NORMAL PREGNANCY, ANTEPARTUM: Primary | ICD-10-CM

## 2025-08-25 PROCEDURE — 0502F SUBSEQUENT PRENATAL CARE: CPT | Performed by: OBSTETRICS & GYNECOLOGY

## 2025-08-28 ENCOUNTER — CARE COORDINATION (OUTPATIENT)
Dept: OTHER | Facility: CLINIC | Age: 25
End: 2025-08-28

## 2025-08-29 LAB — SPECIMEN STATUS REPORT: NORMAL

## 2025-08-31 LAB
A VAGINAE DNA VAG QL NAA+PROBE: NORMAL SCORE
BVAB2 DNA VAG QL NAA+PROBE: NORMAL SCORE
C ALBICANS DNA VAG QL NAA+PROBE: NEGATIVE
C GLABRATA DNA VAG QL NAA+PROBE: NEGATIVE
MEGA1 DNA VAG QL NAA+PROBE: NORMAL SCORE
T VAGINALIS DNA VAG QL NAA+PROBE: NEGATIVE

## 2025-09-02 ENCOUNTER — ROUTINE PRENATAL (OUTPATIENT)
Age: 25
End: 2025-09-02

## 2025-09-02 VITALS — BODY MASS INDEX: 39.78 KG/M2 | SYSTOLIC BLOOD PRESSURE: 115 MMHG | WEIGHT: 261.6 LBS | DIASTOLIC BLOOD PRESSURE: 80 MMHG

## 2025-09-02 DIAGNOSIS — Z3A.38 38 WEEKS GESTATION OF PREGNANCY: ICD-10-CM

## 2025-09-02 DIAGNOSIS — O99.210 OBESITY AFFECTING PREGNANCY, ANTEPARTUM, UNSPECIFIED OBESITY TYPE: ICD-10-CM

## 2025-09-02 DIAGNOSIS — Z34.80 SUPERVISION OF OTHER NORMAL PREGNANCY, ANTEPARTUM: Primary | ICD-10-CM

## 2025-09-02 PROCEDURE — 0502F SUBSEQUENT PRENATAL CARE: CPT | Performed by: OBSTETRICS & GYNECOLOGY
